# Patient Record
Sex: FEMALE | Race: WHITE | Employment: OTHER | URBAN - METROPOLITAN AREA
[De-identification: names, ages, dates, MRNs, and addresses within clinical notes are randomized per-mention and may not be internally consistent; named-entity substitution may affect disease eponyms.]

---

## 2021-01-01 ENCOUNTER — APPOINTMENT (OUTPATIENT)
Dept: CT IMAGING | Age: 61
DRG: 872 | End: 2021-01-01
Attending: EMERGENCY MEDICINE

## 2021-01-01 ENCOUNTER — HOSPITAL ENCOUNTER (INPATIENT)
Age: 61
LOS: 1 days | Discharge: HOSPICE | DRG: 872 | End: 2021-10-19
Attending: EMERGENCY MEDICINE | Admitting: HEALTH CARE PROVIDER

## 2021-01-01 ENCOUNTER — HOSPICE ADMISSION (OUTPATIENT)
Dept: HOSPICE | Facility: HOSPICE | Age: 61
End: 2021-01-01

## 2021-01-01 ENCOUNTER — APPOINTMENT (OUTPATIENT)
Dept: GENERAL RADIOLOGY | Age: 61
DRG: 872 | End: 2021-01-01
Attending: EMERGENCY MEDICINE

## 2021-01-01 ENCOUNTER — HOSPITAL ENCOUNTER (INPATIENT)
Age: 61
LOS: 5 days | End: 2021-10-25
Attending: FAMILY MEDICINE | Admitting: FAMILY MEDICINE

## 2021-01-01 ENCOUNTER — HOSPITAL ENCOUNTER (INPATIENT)
Age: 61
LOS: 1 days | Discharge: HOME | DRG: 951 | End: 2021-10-20
Attending: FAMILY MEDICINE | Admitting: FAMILY MEDICINE
Payer: OTHER MISCELLANEOUS

## 2021-01-01 VITALS
HEIGHT: 66 IN | DIASTOLIC BLOOD PRESSURE: 45 MMHG | OXYGEN SATURATION: 100 % | SYSTOLIC BLOOD PRESSURE: 79 MMHG | BODY MASS INDEX: 17.01 KG/M2 | RESPIRATION RATE: 22 BRPM | HEART RATE: 98 BPM | WEIGHT: 105.82 LBS | TEMPERATURE: 96.5 F

## 2021-01-01 VITALS
RESPIRATION RATE: 18 BRPM | SYSTOLIC BLOOD PRESSURE: 76 MMHG | OXYGEN SATURATION: 98 % | TEMPERATURE: 97.6 F | DIASTOLIC BLOOD PRESSURE: 55 MMHG | HEART RATE: 110 BPM

## 2021-01-01 VITALS
TEMPERATURE: 97.9 F | RESPIRATION RATE: 21 BRPM | HEART RATE: 30 BPM | DIASTOLIC BLOOD PRESSURE: 50 MMHG | OXYGEN SATURATION: 49 % | SYSTOLIC BLOOD PRESSURE: 88 MMHG

## 2021-01-01 DIAGNOSIS — Z51.5 HOSPICE CARE: ICD-10-CM

## 2021-01-01 DIAGNOSIS — S21.001S BREAST WOUND, RIGHT, SEQUELA: ICD-10-CM

## 2021-01-01 DIAGNOSIS — R22.2 CHEST WALL MASS: Primary | ICD-10-CM

## 2021-01-01 DIAGNOSIS — N63.0 BREAST MASS: ICD-10-CM

## 2021-01-01 DIAGNOSIS — G89.3 CANCER ASSOCIATED PAIN: ICD-10-CM

## 2021-01-01 DIAGNOSIS — C50.919 METASTATIC BREAST CANCER (HCC): ICD-10-CM

## 2021-01-01 DIAGNOSIS — E87.1 HYPONATREMIA: ICD-10-CM

## 2021-01-01 DIAGNOSIS — I95.9 HYPOTENSION, UNSPECIFIED HYPOTENSION TYPE: ICD-10-CM

## 2021-01-01 LAB
ABO + RH BLD: NORMAL
ALBUMIN SERPL-MCNC: 1 G/DL (ref 3.5–5)
ALBUMIN SERPL-MCNC: 1.4 G/DL (ref 3.5–5)
ALBUMIN/GLOB SERPL: 0.3 {RATIO} (ref 1.1–2.2)
ALBUMIN/GLOB SERPL: 0.6 {RATIO} (ref 1.1–2.2)
ALP SERPL-CCNC: 213 U/L (ref 45–117)
ALP SERPL-CCNC: 314 U/L (ref 45–117)
ALT SERPL-CCNC: 25 U/L (ref 12–78)
ALT SERPL-CCNC: 34 U/L (ref 12–78)
ANION GAP SERPL CALC-SCNC: 13 MMOL/L (ref 5–15)
ANION GAP SERPL CALC-SCNC: 7 MMOL/L (ref 5–15)
APPEARANCE UR: CLEAR
AST SERPL-CCNC: 27 U/L (ref 15–37)
AST SERPL-CCNC: 36 U/L (ref 15–37)
ATRIAL RATE: 96 BPM
BACTERIA SPEC CULT: ABNORMAL
BACTERIA URNS QL MICRO: NEGATIVE /HPF
BASOPHILS # BLD: 0 K/UL (ref 0–0.1)
BASOPHILS # BLD: 0 K/UL (ref 0–0.1)
BASOPHILS NFR BLD: 0 % (ref 0–1)
BASOPHILS NFR BLD: 0 % (ref 0–1)
BILIRUB SERPL-MCNC: 0.3 MG/DL (ref 0.2–1)
BILIRUB SERPL-MCNC: 0.5 MG/DL (ref 0.2–1)
BILIRUB UR QL: NEGATIVE
BLOOD GROUP ANTIBODIES SERPL: NORMAL
BUN SERPL-MCNC: 46 MG/DL (ref 6–20)
BUN SERPL-MCNC: 64 MG/DL (ref 6–20)
BUN/CREAT SERPL: 60 (ref 12–20)
BUN/CREAT SERPL: 61 (ref 12–20)
CALCIUM SERPL-MCNC: 7.8 MG/DL (ref 8.5–10.1)
CALCIUM SERPL-MCNC: 8.6 MG/DL (ref 8.5–10.1)
CALCULATED P AXIS, ECG09: 66 DEGREES
CALCULATED R AXIS, ECG10: -122 DEGREES
CALCULATED T AXIS, ECG11: 79 DEGREES
CC UR VC: ABNORMAL
CHLORIDE SERPL-SCNC: 103 MMOL/L (ref 97–108)
CHLORIDE SERPL-SCNC: 112 MMOL/L (ref 97–108)
CO2 SERPL-SCNC: 10 MMOL/L (ref 21–32)
CO2 SERPL-SCNC: 18 MMOL/L (ref 21–32)
COLOR UR: ABNORMAL
COMMENT, HOLDF: NORMAL
COVID-19 RAPID TEST, COVR: NOT DETECTED
CREAT SERPL-MCNC: 0.75 MG/DL (ref 0.55–1.02)
CREAT SERPL-MCNC: 1.06 MG/DL (ref 0.55–1.02)
DIAGNOSIS, 93000: NORMAL
DIFFERENTIAL METHOD BLD: ABNORMAL
DIFFERENTIAL METHOD BLD: ABNORMAL
EOSINOPHIL # BLD: 0 K/UL (ref 0–0.4)
EOSINOPHIL # BLD: 0 K/UL (ref 0–0.4)
EOSINOPHIL NFR BLD: 0 % (ref 0–7)
EOSINOPHIL NFR BLD: 0 % (ref 0–7)
EPITH CASTS URNS QL MICRO: ABNORMAL /LPF
ERYTHROCYTE [DISTWIDTH] IN BLOOD BY AUTOMATED COUNT: 16.6 % (ref 11.5–14.5)
ERYTHROCYTE [DISTWIDTH] IN BLOOD BY AUTOMATED COUNT: 16.6 % (ref 11.5–14.5)
GLOBULIN SER CALC-MCNC: 2.2 G/DL (ref 2–4)
GLOBULIN SER CALC-MCNC: 3.3 G/DL (ref 2–4)
GLUCOSE BLD STRIP.AUTO-MCNC: 96 MG/DL (ref 65–117)
GLUCOSE SERPL-MCNC: 123 MG/DL (ref 65–100)
GLUCOSE SERPL-MCNC: 53 MG/DL (ref 65–100)
GLUCOSE UR STRIP.AUTO-MCNC: NEGATIVE MG/DL
HCT VFR BLD AUTO: 26 % (ref 35–47)
HCT VFR BLD AUTO: 34.5 % (ref 35–47)
HGB BLD-MCNC: 11.5 G/DL (ref 11.5–16)
HGB BLD-MCNC: 8.6 G/DL (ref 11.5–16)
HGB UR QL STRIP: NEGATIVE
HYALINE CASTS URNS QL MICRO: ABNORMAL /LPF (ref 0–5)
IMM GRANULOCYTES # BLD AUTO: 0 K/UL
IMM GRANULOCYTES # BLD AUTO: 0 K/UL
IMM GRANULOCYTES NFR BLD AUTO: 0 %
IMM GRANULOCYTES NFR BLD AUTO: 0 %
KETONES UR QL STRIP.AUTO: ABNORMAL MG/DL
LACTATE SERPL-SCNC: 1.9 MMOL/L (ref 0.4–2)
LEUKOCYTE ESTERASE UR QL STRIP.AUTO: ABNORMAL
LYMPHOCYTES # BLD: 0.9 K/UL (ref 0.8–3.5)
LYMPHOCYTES # BLD: 1.5 K/UL (ref 0.8–3.5)
LYMPHOCYTES NFR BLD: 2 % (ref 12–49)
LYMPHOCYTES NFR BLD: 2 % (ref 12–49)
MAGNESIUM SERPL-MCNC: 1.9 MG/DL (ref 1.6–2.4)
MCH RBC QN AUTO: 25.5 PG (ref 26–34)
MCH RBC QN AUTO: 25.8 PG (ref 26–34)
MCHC RBC AUTO-ENTMCNC: 33.1 G/DL (ref 30–36.5)
MCHC RBC AUTO-ENTMCNC: 33.3 G/DL (ref 30–36.5)
MCV RBC AUTO: 76.5 FL (ref 80–99)
MCV RBC AUTO: 78.1 FL (ref 80–99)
METAMYELOCYTES NFR BLD MANUAL: 1 %
METAMYELOCYTES NFR BLD MANUAL: 1 %
MONOCYTES # BLD: 0.7 K/UL (ref 0–1)
MONOCYTES # BLD: 1.7 K/UL (ref 0–1)
MONOCYTES NFR BLD: 1 % (ref 5–13)
MONOCYTES NFR BLD: 4 % (ref 5–13)
MUCOUS THREADS URNS QL MICRO: ABNORMAL /LPF
MYELOCYTES NFR BLD MANUAL: 1 %
NEUTS BAND NFR BLD MANUAL: 2 % (ref 0–6)
NEUTS BAND NFR BLD MANUAL: 4 % (ref 0–6)
NEUTS SEG # BLD: 39.8 K/UL (ref 1.8–8)
NEUTS SEG # BLD: 69.9 K/UL (ref 1.8–8)
NEUTS SEG NFR BLD: 89 % (ref 32–75)
NEUTS SEG NFR BLD: 93 % (ref 32–75)
NITRITE UR QL STRIP.AUTO: NEGATIVE
NRBC # BLD: 0 K/UL (ref 0–0.01)
NRBC # BLD: 0 K/UL (ref 0–0.01)
NRBC BLD-RTO: 0 PER 100 WBC
NRBC BLD-RTO: 0 PER 100 WBC
OTHER,OTHU: ABNORMAL
P-R INTERVAL, ECG05: 126 MS
PH UR STRIP: 5.5 [PH] (ref 5–8)
PHOSPHATE SERPL-MCNC: 3.8 MG/DL (ref 2.6–4.7)
PLATELET # BLD AUTO: 484 K/UL (ref 150–400)
PLATELET # BLD AUTO: 627 K/UL (ref 150–400)
PMV BLD AUTO: 9.4 FL (ref 8.9–12.9)
PMV BLD AUTO: 9.4 FL (ref 8.9–12.9)
POTASSIUM SERPL-SCNC: 4.1 MMOL/L (ref 3.5–5.1)
POTASSIUM SERPL-SCNC: 5.2 MMOL/L (ref 3.5–5.1)
PROCALCITONIN SERPL-MCNC: 0.98 NG/ML
PROT SERPL-MCNC: 3.6 G/DL (ref 6.4–8.2)
PROT SERPL-MCNC: 4.3 G/DL (ref 6.4–8.2)
PROT UR STRIP-MCNC: ABNORMAL MG/DL
Q-T INTERVAL, ECG07: 330 MS
QRS DURATION, ECG06: 74 MS
QTC CALCULATION (BEZET), ECG08: 416 MS
RBC # BLD AUTO: 3.33 M/UL (ref 3.8–5.2)
RBC # BLD AUTO: 4.51 M/UL (ref 3.8–5.2)
RBC #/AREA URNS HPF: ABNORMAL /HPF (ref 0–5)
RBC MORPH BLD: ABNORMAL
SAMPLES BEING HELD,HOLD: NORMAL
SERVICE CMNT-IMP: ABNORMAL
SERVICE CMNT-IMP: ABNORMAL
SERVICE CMNT-IMP: NORMAL
SODIUM SERPL-SCNC: 128 MMOL/L (ref 136–145)
SODIUM SERPL-SCNC: 135 MMOL/L (ref 136–145)
SOURCE, COVRS: NORMAL
SP GR UR REFRACTOMETRY: 1.02 (ref 1–1.03)
SPECIMEN EXP DATE BLD: NORMAL
UROBILINOGEN UR QL STRIP.AUTO: 0.2 EU/DL (ref 0.2–1)
VENTRICULAR RATE, ECG03: 96 BPM
WBC # BLD AUTO: 42.8 K/UL (ref 3.6–11)
WBC # BLD AUTO: 73.6 K/UL (ref 3.6–11)
WBC MORPH BLD: ABNORMAL
WBC URNS QL MICRO: ABNORMAL /HPF (ref 0–4)

## 2021-01-01 PROCEDURE — 71045 X-RAY EXAM CHEST 1 VIEW: CPT

## 2021-01-01 PROCEDURE — G0299 HHS/HOSPICE OF RN EA 15 MIN: HCPCS

## 2021-01-01 PROCEDURE — 83735 ASSAY OF MAGNESIUM: CPT

## 2021-01-01 PROCEDURE — 84100 ASSAY OF PHOSPHORUS: CPT

## 2021-01-01 PROCEDURE — 3336590001 HSPC ROOM AND BOARD

## 2021-01-01 PROCEDURE — 74011250636 HC RX REV CODE- 250/636: Performed by: NURSE PRACTITIONER

## 2021-01-01 PROCEDURE — 83605 ASSAY OF LACTIC ACID: CPT

## 2021-01-01 PROCEDURE — 74177 CT ABD & PELVIS W/CONTRAST: CPT

## 2021-01-01 PROCEDURE — 0651 HSPC ROUTINE HOME CARE

## 2021-01-01 PROCEDURE — 74011250636 HC RX REV CODE- 250/636: Performed by: FAMILY MEDICINE

## 2021-01-01 PROCEDURE — 87635 SARS-COV-2 COVID-19 AMP PRB: CPT

## 2021-01-01 PROCEDURE — 74011250637 HC RX REV CODE- 250/637: Performed by: NURSE PRACTITIONER

## 2021-01-01 PROCEDURE — 51798 US URINE CAPACITY MEASURE: CPT

## 2021-01-01 PROCEDURE — 3336500001 HSPC ELECTION

## 2021-01-01 PROCEDURE — 99221 1ST HOSP IP/OBS SF/LOW 40: CPT | Performed by: NURSE PRACTITIONER

## 2021-01-01 PROCEDURE — 80053 COMPREHEN METABOLIC PANEL: CPT

## 2021-01-01 PROCEDURE — 87186 SC STD MICRODIL/AGAR DIL: CPT

## 2021-01-01 PROCEDURE — 71260 CT THORAX DX C+: CPT

## 2021-01-01 PROCEDURE — 87086 URINE CULTURE/COLONY COUNT: CPT

## 2021-01-01 PROCEDURE — 86901 BLOOD TYPING SEROLOGIC RH(D): CPT

## 2021-01-01 PROCEDURE — 36415 COLL VENOUS BLD VENIPUNCTURE: CPT

## 2021-01-01 PROCEDURE — 0656 HSPC GENERAL INPATIENT

## 2021-01-01 PROCEDURE — 74011000636 HC RX REV CODE- 636: Performed by: EMERGENCY MEDICINE

## 2021-01-01 PROCEDURE — 82962 GLUCOSE BLOOD TEST: CPT

## 2021-01-01 PROCEDURE — 36600 WITHDRAWAL OF ARTERIAL BLOOD: CPT

## 2021-01-01 PROCEDURE — 74011250636 HC RX REV CODE- 250/636: Performed by: EMERGENCY MEDICINE

## 2021-01-01 PROCEDURE — 74011250637 HC RX REV CODE- 250/637: Performed by: FAMILY MEDICINE

## 2021-01-01 PROCEDURE — 74011000258 HC RX REV CODE- 258: Performed by: NURSE PRACTITIONER

## 2021-01-01 PROCEDURE — 99252 IP/OBS CONSLTJ NEW/EST SF 35: CPT | Performed by: SURGERY

## 2021-01-01 PROCEDURE — 84145 PROCALCITONIN (PCT): CPT

## 2021-01-01 PROCEDURE — 85025 COMPLETE CBC W/AUTO DIFF WBC: CPT

## 2021-01-01 PROCEDURE — 99223 1ST HOSP IP/OBS HIGH 75: CPT | Performed by: INTERNAL MEDICINE

## 2021-01-01 PROCEDURE — 99285 EMERGENCY DEPT VISIT HI MDM: CPT

## 2021-01-01 PROCEDURE — 99233 SBSQ HOSP IP/OBS HIGH 50: CPT | Performed by: INTERNAL MEDICINE

## 2021-01-01 PROCEDURE — P9045 ALBUMIN (HUMAN), 5%, 250 ML: HCPCS | Performed by: NURSE PRACTITIONER

## 2021-01-01 PROCEDURE — 65270000032 HC RM SEMIPRIVATE

## 2021-01-01 PROCEDURE — 96365 THER/PROPH/DIAG IV INF INIT: CPT

## 2021-01-01 PROCEDURE — 96375 TX/PRO/DX INJ NEW DRUG ADDON: CPT

## 2021-01-01 PROCEDURE — 87077 CULTURE AEROBIC IDENTIFY: CPT

## 2021-01-01 PROCEDURE — 81001 URINALYSIS AUTO W/SCOPE: CPT

## 2021-01-01 PROCEDURE — 87040 BLOOD CULTURE FOR BACTERIA: CPT

## 2021-01-01 PROCEDURE — 65270000029 HC RM PRIVATE

## 2021-01-01 PROCEDURE — 65610000006 HC RM INTENSIVE CARE

## 2021-01-01 PROCEDURE — 93005 ELECTROCARDIOGRAM TRACING: CPT

## 2021-01-01 PROCEDURE — 74011000250 HC RX REV CODE- 250: Performed by: EMERGENCY MEDICINE

## 2021-01-01 RX ORDER — HYDROMORPHONE HYDROCHLORIDE 1 MG/ML
1 INJECTION, SOLUTION INTRAMUSCULAR; INTRAVENOUS; SUBCUTANEOUS EVERY 4 HOURS
Status: DISCONTINUED | OUTPATIENT
Start: 2021-01-01 | End: 2021-01-01 | Stop reason: HOSPADM

## 2021-01-01 RX ORDER — LORAZEPAM 2 MG/ML
1 INJECTION INTRAMUSCULAR
Status: DISCONTINUED | OUTPATIENT
Start: 2021-01-01 | End: 2021-01-01 | Stop reason: HOSPADM

## 2021-01-01 RX ORDER — METRONIDAZOLE 7.5 MG/G
GEL TOPICAL 2 TIMES DAILY
Status: DISCONTINUED | OUTPATIENT
Start: 2021-01-01 | End: 2021-01-01 | Stop reason: HOSPADM

## 2021-01-01 RX ORDER — METRONIDAZOLE 250 MG/1
1000 TABLET ORAL DAILY
Status: DISCONTINUED | OUTPATIENT
Start: 2021-01-01 | End: 2021-01-01 | Stop reason: HOSPADM

## 2021-01-01 RX ORDER — LORAZEPAM 2 MG/ML
1 INJECTION INTRAMUSCULAR
Status: DISCONTINUED | OUTPATIENT
Start: 2021-01-01 | End: 2021-01-01 | Stop reason: DRUGHIGH

## 2021-01-01 RX ORDER — FACIAL-BODY WIPES
10 EACH TOPICAL DAILY PRN
Status: DISCONTINUED | OUTPATIENT
Start: 2021-01-01 | End: 2021-01-01 | Stop reason: HOSPADM

## 2021-01-01 RX ORDER — ACETAMINOPHEN 650 MG/1
650 SUPPOSITORY RECTAL
Status: DISCONTINUED | OUTPATIENT
Start: 2021-01-01 | End: 2021-01-01 | Stop reason: HOSPADM

## 2021-01-01 RX ORDER — MORPHINE SULFATE 2 MG/ML
2 INJECTION, SOLUTION INTRAMUSCULAR; INTRAVENOUS EVERY 4 HOURS
Status: DISCONTINUED | OUTPATIENT
Start: 2021-01-01 | End: 2021-01-01 | Stop reason: HOSPADM

## 2021-01-01 RX ORDER — MORPHINE SULFATE 2 MG/ML
2 INJECTION, SOLUTION INTRAMUSCULAR; INTRAVENOUS
Status: DISCONTINUED | OUTPATIENT
Start: 2021-01-01 | End: 2021-01-01 | Stop reason: HOSPADM

## 2021-01-01 RX ORDER — LORAZEPAM 2 MG/ML
0.5 CONCENTRATE ORAL
Status: DISCONTINUED | OUTPATIENT
Start: 2021-01-01 | End: 2021-01-01 | Stop reason: DRUGHIGH

## 2021-01-01 RX ORDER — METRONIDAZOLE 250 MG/1
500 TABLET ORAL DAILY
Status: DISCONTINUED | OUTPATIENT
Start: 2021-01-01 | End: 2021-01-01

## 2021-01-01 RX ORDER — MIDODRINE HYDROCHLORIDE 5 MG/1
10 TABLET ORAL EVERY 8 HOURS
Status: DISCONTINUED | OUTPATIENT
Start: 2021-01-01 | End: 2021-01-01

## 2021-01-01 RX ORDER — MORPHINE SULFATE 20 MG/ML
5 SOLUTION ORAL
Status: DISCONTINUED | OUTPATIENT
Start: 2021-01-01 | End: 2021-01-01 | Stop reason: DRUGHIGH

## 2021-01-01 RX ORDER — MORPHINE SULFATE 2 MG/ML
2 INJECTION, SOLUTION INTRAMUSCULAR; INTRAVENOUS
Status: DISCONTINUED | OUTPATIENT
Start: 2021-01-01 | End: 2021-01-01

## 2021-01-01 RX ORDER — LORAZEPAM 2 MG/ML
1 INJECTION INTRAMUSCULAR EVERY 4 HOURS
Status: DISCONTINUED | OUTPATIENT
Start: 2021-01-01 | End: 2021-01-01 | Stop reason: HOSPADM

## 2021-01-01 RX ORDER — SODIUM CHLORIDE 0.9 % (FLUSH) 0.9 %
5-40 SYRINGE (ML) INJECTION AS NEEDED
Status: DISCONTINUED | OUTPATIENT
Start: 2021-01-01 | End: 2021-01-01 | Stop reason: HOSPADM

## 2021-01-01 RX ORDER — HYDROMORPHONE HYDROCHLORIDE 1 MG/ML
1 INJECTION, SOLUTION INTRAMUSCULAR; INTRAVENOUS; SUBCUTANEOUS
Status: DISCONTINUED | OUTPATIENT
Start: 2021-01-01 | End: 2021-01-01 | Stop reason: HOSPADM

## 2021-01-01 RX ORDER — SODIUM CHLORIDE 0.9 % (FLUSH) 0.9 %
5-40 SYRINGE (ML) INJECTION EVERY 8 HOURS
Status: DISCONTINUED | OUTPATIENT
Start: 2021-01-01 | End: 2021-01-01 | Stop reason: HOSPADM

## 2021-01-01 RX ORDER — MORPHINE SULFATE 2 MG/ML
2 INJECTION, SOLUTION INTRAMUSCULAR; INTRAVENOUS EVERY 4 HOURS
Status: DISCONTINUED | OUTPATIENT
Start: 2021-01-01 | End: 2021-01-01

## 2021-01-01 RX ORDER — GLYCOPYRROLATE 0.2 MG/ML
0.2 INJECTION INTRAMUSCULAR; INTRAVENOUS
Status: DISCONTINUED | OUTPATIENT
Start: 2021-01-01 | End: 2021-01-01 | Stop reason: HOSPADM

## 2021-01-01 RX ORDER — PEPPERMINT OIL
SPIRIT ORAL AS NEEDED
Status: DISCONTINUED | OUTPATIENT
Start: 2021-01-01 | End: 2021-01-01 | Stop reason: HOSPADM

## 2021-01-01 RX ORDER — SODIUM CHLORIDE 9 MG/ML
125 INJECTION, SOLUTION INTRAVENOUS CONTINUOUS
Status: DISCONTINUED | OUTPATIENT
Start: 2021-01-01 | End: 2021-01-01

## 2021-01-01 RX ORDER — AMOXICILLIN 250 MG
2 CAPSULE ORAL DAILY
Status: DISCONTINUED | OUTPATIENT
Start: 2021-01-01 | End: 2021-01-01

## 2021-01-01 RX ORDER — ALBUMIN HUMAN 50 G/1000ML
25 SOLUTION INTRAVENOUS ONCE
Status: COMPLETED | OUTPATIENT
Start: 2021-01-01 | End: 2021-01-01

## 2021-01-01 RX ORDER — MORPHINE SULFATE 4 MG/ML
4 INJECTION INTRAVENOUS EVERY 4 HOURS
Status: DISCONTINUED | OUTPATIENT
Start: 2021-01-01 | End: 2021-01-01 | Stop reason: DRUGHIGH

## 2021-01-01 RX ORDER — MORPHINE SULFATE 4 MG/ML
4 INJECTION INTRAVENOUS
Status: DISCONTINUED | OUTPATIENT
Start: 2021-01-01 | End: 2021-01-01 | Stop reason: DRUGHIGH

## 2021-01-01 RX ORDER — ACETAMINOPHEN 325 MG/1
650 TABLET ORAL
Status: DISCONTINUED | OUTPATIENT
Start: 2021-01-01 | End: 2021-01-01 | Stop reason: HOSPADM

## 2021-01-01 RX ADMIN — MORPHINE SULFATE 4 MG: 4 INJECTION INTRAVENOUS at 12:01

## 2021-01-01 RX ADMIN — METRONIDAZOLE: 7.5 GEL TOPICAL at 09:22

## 2021-01-01 RX ADMIN — MORPHINE SULFATE 5 MG: 10 SOLUTION ORAL at 05:31

## 2021-01-01 RX ADMIN — LORAZEPAM 0.5 MG: 2 SOLUTION, CONCENTRATE ORAL at 07:46

## 2021-01-01 RX ADMIN — MORPHINE SULFATE 5 MG: 10 SOLUTION ORAL at 13:49

## 2021-01-01 RX ADMIN — MIDODRINE HYDROCHLORIDE 10 MG: 5 TABLET ORAL at 06:26

## 2021-01-01 RX ADMIN — LORAZEPAM 1 MG: 2 INJECTION INTRAMUSCULAR; INTRAVENOUS at 16:38

## 2021-01-01 RX ADMIN — MORPHINE SULFATE 5 MG: 10 SOLUTION ORAL at 07:56

## 2021-01-01 RX ADMIN — LORAZEPAM 1 MG: 2 INJECTION INTRAMUSCULAR; INTRAVENOUS at 12:01

## 2021-01-01 RX ADMIN — MORPHINE SULFATE 2 MG: 2 INJECTION, SOLUTION INTRAMUSCULAR; INTRAVENOUS at 21:46

## 2021-01-01 RX ADMIN — MORPHINE SULFATE 5 MG: 10 SOLUTION ORAL at 03:32

## 2021-01-01 RX ADMIN — Medication 10 ML: at 14:00

## 2021-01-01 RX ADMIN — METRONIDAZOLE 1000 MG: 250 TABLET ORAL at 11:39

## 2021-01-01 RX ADMIN — MORPHINE SULFATE 2 MG: 2 INJECTION, SOLUTION INTRAMUSCULAR; INTRAVENOUS at 16:35

## 2021-01-01 RX ADMIN — VANCOMYCIN HYDROCHLORIDE 1000 MG: 1 INJECTION, POWDER, LYOPHILIZED, FOR SOLUTION INTRAVENOUS at 17:14

## 2021-01-01 RX ADMIN — MORPHINE SULFATE 5 MG: 10 SOLUTION ORAL at 20:39

## 2021-01-01 RX ADMIN — METRONIDAZOLE 1000 MG: 250 TABLET ORAL at 14:01

## 2021-01-01 RX ADMIN — LORAZEPAM 1 MG: 2 INJECTION INTRAMUSCULAR; INTRAVENOUS at 20:30

## 2021-01-01 RX ADMIN — MIDODRINE HYDROCHLORIDE 10 MG: 5 TABLET ORAL at 22:50

## 2021-01-01 RX ADMIN — LORAZEPAM 1 MG: 2 INJECTION INTRAMUSCULAR; INTRAVENOUS at 13:30

## 2021-01-01 RX ADMIN — MORPHINE SULFATE 2 MG: 2 INJECTION, SOLUTION INTRAMUSCULAR; INTRAVENOUS at 21:45

## 2021-01-01 RX ADMIN — MORPHINE SULFATE 5 MG: 10 SOLUTION ORAL at 18:29

## 2021-01-01 RX ADMIN — HYDROMORPHONE HYDROCHLORIDE 1 MG: 1 INJECTION, SOLUTION INTRAMUSCULAR; INTRAVENOUS; SUBCUTANEOUS at 16:37

## 2021-01-01 RX ADMIN — MORPHINE SULFATE 5 MG: 10 SOLUTION ORAL at 06:32

## 2021-01-01 RX ADMIN — Medication 10 ML: at 06:00

## 2021-01-01 RX ADMIN — LORAZEPAM 0.5 MG: 2 SOLUTION, CONCENTRATE ORAL at 05:11

## 2021-01-01 RX ADMIN — SODIUM CHLORIDE 1000 ML: 9 INJECTION, SOLUTION INTRAVENOUS at 14:31

## 2021-01-01 RX ADMIN — BISACODYL 10 MG: 10 SUPPOSITORY RECTAL at 16:54

## 2021-01-01 RX ADMIN — MORPHINE SULFATE 5 MG: 10 SOLUTION ORAL at 19:52

## 2021-01-01 RX ADMIN — MORPHINE SULFATE 2 MG: 2 INJECTION, SOLUTION INTRAMUSCULAR; INTRAVENOUS at 03:57

## 2021-01-01 RX ADMIN — SODIUM CHLORIDE 125 ML/HR: 9 INJECTION, SOLUTION INTRAVENOUS at 20:45

## 2021-01-01 RX ADMIN — SODIUM CHLORIDE 383 ML: 9 INJECTION, SOLUTION INTRAVENOUS at 15:06

## 2021-01-01 RX ADMIN — MORPHINE SULFATE 5 MG: 10 SOLUTION ORAL at 07:47

## 2021-01-01 RX ADMIN — MORPHINE SULFATE 5 MG: 10 SOLUTION ORAL at 05:11

## 2021-01-01 RX ADMIN — MORPHINE SULFATE 4 MG: 4 INJECTION INTRAVENOUS at 09:34

## 2021-01-01 RX ADMIN — Medication 10 ML: at 06:27

## 2021-01-01 RX ADMIN — METRONIDAZOLE: 7.5 GEL TOPICAL at 17:56

## 2021-01-01 RX ADMIN — SODIUM CHLORIDE 1000 ML: 9 INJECTION, SOLUTION INTRAVENOUS at 18:39

## 2021-01-01 RX ADMIN — MORPHINE SULFATE 4 MG: 4 INJECTION INTRAVENOUS at 09:01

## 2021-01-01 RX ADMIN — LORAZEPAM 1 MG: 2 INJECTION INTRAMUSCULAR; INTRAVENOUS at 09:33

## 2021-01-01 RX ADMIN — CEFEPIME HYDROCHLORIDE 1 G: 1 INJECTION, POWDER, FOR SOLUTION INTRAMUSCULAR; INTRAVENOUS at 17:04

## 2021-01-01 RX ADMIN — METRONIDAZOLE 500 MG: 250 TABLET ORAL at 09:49

## 2021-01-01 RX ADMIN — MORPHINE SULFATE 2 MG: 2 INJECTION, SOLUTION INTRAMUSCULAR; INTRAVENOUS at 03:27

## 2021-01-01 RX ADMIN — MORPHINE SULFATE 4 MG: 4 INJECTION INTRAVENOUS at 13:30

## 2021-01-01 RX ADMIN — ALBUMIN (HUMAN) 25 G: 12.5 INJECTION, SOLUTION INTRAVENOUS at 03:14

## 2021-01-01 RX ADMIN — HYDROMORPHONE HYDROCHLORIDE 1 MG: 1 INJECTION, SOLUTION INTRAMUSCULAR; INTRAVENOUS; SUBCUTANEOUS at 20:30

## 2021-01-01 RX ADMIN — Medication 5 ML: at 17:09

## 2021-01-01 RX ADMIN — IOPAMIDOL 100 ML: 755 INJECTION, SOLUTION INTRAVENOUS at 16:31

## 2021-01-01 RX ADMIN — PIPERACILLIN AND TAZOBACTAM 3.38 G: 3; .375 INJECTION, POWDER, LYOPHILIZED, FOR SOLUTION INTRAVENOUS at 01:28

## 2021-01-01 RX ADMIN — LORAZEPAM 1 MG: 2 INJECTION INTRAMUSCULAR; INTRAVENOUS at 09:01

## 2021-01-01 RX ADMIN — MORPHINE SULFATE 1 MG: 2 INJECTION, SOLUTION INTRAMUSCULAR; INTRAVENOUS at 13:53

## 2021-01-01 RX ADMIN — MORPHINE SULFATE 5 MG: 10 SOLUTION ORAL at 20:27

## 2021-01-01 RX ADMIN — HYDROMORPHONE HYDROCHLORIDE 1 MG: 1 INJECTION, SOLUTION INTRAMUSCULAR; INTRAVENOUS; SUBCUTANEOUS at 00:29

## 2021-01-01 RX ADMIN — DOCUSATE SODIUM 50MG AND SENNOSIDES 8.6MG 2 TABLET: 8.6; 5 TABLET, FILM COATED ORAL at 16:59

## 2021-01-01 RX ADMIN — METRONIDAZOLE 1000 MG: 250 TABLET ORAL at 09:00

## 2021-01-01 RX ADMIN — LORAZEPAM 1 MG: 2 INJECTION INTRAMUSCULAR; INTRAVENOUS at 00:29

## 2021-01-01 RX ADMIN — MORPHINE SULFATE 5 MG: 10 SOLUTION ORAL at 00:23

## 2021-01-01 RX ADMIN — PIPERACILLIN AND TAZOBACTAM 3.38 G: 3; .375 INJECTION, POWDER, LYOPHILIZED, FOR SOLUTION INTRAVENOUS at 18:23

## 2021-10-18 PROBLEM — N63.0 BREAST MASS: Status: ACTIVE | Noted: 2021-01-01

## 2021-10-18 NOTE — WOUND CARE
Discussed by phone with RN regarding erupting breast tumor. General surgery has seen her today & will consult breast surgeon. Advised ER RN to use non-adherent contact layer like Mepitel One since these areas are very friable and possibly Metrogel for odor. Will see tomorrow.    Brenda Pierre, GABBYN

## 2021-10-18 NOTE — CONSULTS
Consult Date: 10/18/2021    Consults    Subjective  56 yo female presents with fungating, ulcerating right breast mass. She has been self treating this for 3 years. She states she believed it to be caused by parasites and she was de-worming herself. History reviewed. No pertinent past medical history. Past Surgical History:   Procedure Laterality Date    HX CATARACT REMOVAL       History reviewed. No pertinent family history. Social History     Tobacco Use    Smoking status: Never Smoker    Smokeless tobacco: Never Used   Substance Use Topics    Alcohol use: Not on file       Current Facility-Administered Medications   Medication Dose Route Frequency Provider Last Rate Last Admin    cefepime (MAXIPIME) 1 g in sterile water (preservative free) 10 mL IV syringe  1 g IntraVENous NOW Lesly Kohler MD        vancomycin (VANCOCIN) 1,000 mg in 0.9% sodium chloride 250 mL (VIAL-MATE)  1,000 mg IntraVENous NOW Lesly Kohler MD        iopamidoL (ISOVUE-370) 76 % injection 100 mL  100 mL IntraVENous RAD ONCE Lesly Kohler MD            Review of Systems   All other systems reviewed and are negative. Objective     Vital signs for last 24 hours:  Visit Vitals  BP (!) 79/60   Pulse 97   Temp 96.9 °F (36.1 °C)   Resp 22   Ht 5' 6\" (1.676 m)   Wt 101 lb 10.1 oz (46.1 kg)   SpO2 98%   BMI 16.40 kg/m²       Intake/Output this shift:  Current Shift: No intake/output data recorded. Last 3 Shifts: No intake/output data recorded.     Data Review:   Recent Results (from the past 24 hour(s))   CBC WITH AUTOMATED DIFF    Collection Time: 10/18/21  2:00 PM   Result Value Ref Range    WBC 73.6 (HH) 3.6 - 11.0 K/uL    RBC 4.51 3.80 - 5.20 M/uL    HGB 11.5 11.5 - 16.0 g/dL    HCT 34.5 (L) 35.0 - 47.0 %    MCV 76.5 (L) 80.0 - 99.0 FL    MCH 25.5 (L) 26.0 - 34.0 PG    MCHC 33.3 30.0 - 36.5 g/dL    RDW 16.6 (H) 11.5 - 14.5 %    PLATELET 553 (H) 142 - 400 K/uL    MPV 9.4 8.9 - 12.9 FL NRBC 0.0 0  WBC    ABSOLUTE NRBC 0.00 0.00 - 0.01 K/uL    NEUTROPHILS 93 (H) 32 - 75 %    BAND NEUTROPHILS 2 0 - 6 %    LYMPHOCYTES 2 (L) 12 - 49 %    MONOCYTES 1 (L) 5 - 13 %    EOSINOPHILS 0 0 - 7 %    BASOPHILS 0 0 - 1 %    METAMYELOCYTES 1 (H) 0 %    MYELOCYTES 1 (H) 0 %    IMMATURE GRANULOCYTES 0 %    ABS. NEUTROPHILS 69.9 (H) 1.8 - 8.0 K/UL    ABS. LYMPHOCYTES 1.5 0.8 - 3.5 K/UL    ABS. MONOCYTES 0.7 0.0 - 1.0 K/UL    ABS. EOSINOPHILS 0.0 0.0 - 0.4 K/UL    ABS. BASOPHILS 0.0 0.0 - 0.1 K/UL    ABS. IMM. GRANS. 0.0 K/UL    DF MANUAL      RBC COMMENTS ANISOCYTOSIS  1+        RBC COMMENTS WILEY CELLS  PRESENT        RBC COMMENTS MICROCYTOSIS  1+        RBC COMMENTS OVALOCYTES  PRESENT        WBC COMMENTS Pathology Review Requested     METABOLIC PANEL, COMPREHENSIVE    Collection Time: 10/18/21  2:00 PM   Result Value Ref Range    Sodium 128 (L) 136 - 145 mmol/L    Potassium 5.2 (H) 3.5 - 5.1 mmol/L    Chloride 103 97 - 108 mmol/L    CO2 18 (L) 21 - 32 mmol/L    Anion gap 7 5 - 15 mmol/L    Glucose 123 (H) 65 - 100 mg/dL    BUN 64 (H) 6 - 20 MG/DL    Creatinine 1.06 (H) 0.55 - 1.02 MG/DL    BUN/Creatinine ratio 60 (H) 12 - 20      GFR est AA >60 >60 ml/min/1.73m2    GFR est non-AA 53 (L) >60 ml/min/1.73m2    Calcium 8.6 8.5 - 10.1 MG/DL    Bilirubin, total 0.3 0.2 - 1.0 MG/DL    ALT (SGPT) 34 12 - 78 U/L    AST (SGOT) 36 15 - 37 U/L    Alk. phosphatase 314 (H) 45 - 117 U/L    Protein, total 4.3 (L) 6.4 - 8.2 g/dL    Albumin 1.0 (L) 3.5 - 5.0 g/dL    Globulin 3.3 2.0 - 4.0 g/dL    A-G Ratio 0.3 (L) 1.1 - 2.2     SAMPLES BEING HELD    Collection Time: 10/18/21  2:00 PM   Result Value Ref Range    SAMPLES BEING HELD 1RED 1BLU     COMMENT        Add-on orders for these samples will be processed based on acceptable specimen integrity and analyte stability, which may vary by analyte.    LACTIC ACID    Collection Time: 10/18/21  2:00 PM   Result Value Ref Range    Lactic acid 1.9 0.4 - 2.0 MMOL/L   PROCALCITONIN Collection Time: 10/18/21  2:00 PM   Result Value Ref Range    Procalcitonin 0.98 ng/mL   EKG, 12 LEAD, INITIAL    Collection Time: 10/18/21  2:23 PM   Result Value Ref Range    Ventricular Rate 96 BPM    Atrial Rate 96 BPM    P-R Interval 126 ms    QRS Duration 74 ms    Q-T Interval 330 ms    QTC Calculation (Bezet) 416 ms    Calculated P Axis 66 degrees    Calculated R Axis -122 degrees    Calculated T Axis 79 degrees    Diagnosis       ** Poor data quality, interpretation may be adversely affected Normal sinus   rhythm  Right atrial enlargement  Right superior axis deviation  Nonspecific ST abnormality  Abnormal ECG  No previous ECGs available  Confirmed by Denisa Ken M.D., Marce Desai ([id number]) on 10/18/2021 2:34:57 PM     URINALYSIS W/MICROSCOPIC    Collection Time: 10/18/21  2:40 PM   Result Value Ref Range    Color DARK YELLOW      Appearance CLEAR CLEAR      Specific gravity 1.022 1.003 - 1.030      pH (UA) 5.5 5.0 - 8.0      Protein TRACE (A) NEG mg/dL    Glucose Negative NEG mg/dL    Ketone TRACE (A) NEG mg/dL    Bilirubin Negative NEG      Blood Negative NEG      Urobilinogen 0.2 0.2 - 1.0 EU/dL    Nitrites Negative NEG      Leukocyte Esterase MODERATE (A) NEG      WBC 10-20 0 - 4 /hpf    RBC 0-5 0 - 5 /hpf    Epithelial cells FEW FEW /lpf    Bacteria Negative NEG /hpf    Mucus 1+ (A) NEG /lpf    Hyaline cast 2-5 0 - 5 /lpf    Other: Renal Epithelial cells Present         Physical Exam  Breast: Right breast completely gone, only 12 x 12 cm fungating mass, ulcerated. Malodorous. No nipple. The is also an ulcerated lesion mid sternum 4 x 4 cm     A/P  Locally advanced breast carcinoma  - Spoke with Dr. Chanelle Merlos in ED. Will check scans. I will do confirmatory punch biopsy tomorrow. Likely metastatic at this point. Recommend consulting wound care and medical oncology. Surgical intervention not warranted. Unresectable. Admit to medicine for hypotension, possible infection.

## 2021-10-18 NOTE — PROGRESS NOTES
[facility] [place] [id number] [address]  W: [phone]  F: [phone]    Reason for Visit:   Rakesh King is a 57 y.o. female who is seen in ER at the request of Dr. Peewee Craig / ER attending for evaluation of breast mass. Treatment History:   No tissue dx yet    STAGE: pending    History of Present Illness:     Pt seen today in ER for large fungating RIGHT breast mass present for years. Pt does not sees doctors and states she was taking de worming medicine for breast mass. No tissue dx of cancer. Pt is cachetic in bed. States she know mass may be cancer but wants to know origin of cancer. Pt denies pain. Nursing at bedside. CTs pending. Wbc 70 k. No fevers/ chills/ chest pain/ SOB/ nausea/ vomiting/diarrhea/ pain/      History reviewed. No pertinent past medical history. Past Surgical History:   Procedure Laterality Date    HX CATARACT REMOVAL        Social History     Tobacco Use    Smoking status: Never Smoker    Smokeless tobacco: Never Used   Substance Use Topics    Alcohol use: Not on file      History reviewed. No pertinent family history. Current Facility-Administered Medications   Medication Dose Route Frequency    sodium chloride (NS) flush 5-40 mL  5-40 mL IntraVENous Q8H    sodium chloride (NS) flush 5-40 mL  5-40 mL IntraVENous PRN    piperacillin-tazobactam (ZOSYN) 3.375 g in 0.9% sodium chloride (MBP/ADV) 100 mL MBP  3.375 g IntraVENous Q8H    sodium chloride 0.9 % bolus infusion 1,000 mL  1,000 mL IntraVENous ONCE    0.9% sodium chloride infusion  125 mL/hr IntraVENous CONTINUOUS     No current outpatient medications on file. No Known Allergies     Review of Systems: A complete review of systems was obtained, negative except as described above.     Physical Exam:     Visit Vitals  BP (!) 83/53   Pulse 99   Temp 96.9 °F (36.1 °C)   Resp 20   Ht 5' 6\" (1.676 m)   Wt 101 lb 10.1 oz (46.1 kg)   SpO2 98%   BMI 16.40 kg/m²     ECOG PS: 2  General: No distress  Eyes: anicteric sclerae  HENT: Atraumatic  Neck: Supple  Respiratory: CTAB, normal respiratory effort  CV: Normal rate, regular rhythm, no murmurs, no peripheral edema  GI: Soft, nontender, nondistended, no masses, no hepatomegaly, no splenomegaly  MS: in bed moving  Skin:  Skin changes RIGHT breast  Breast large fungating, malodorous, draining diffuse RIGHT breast mass  Psych:  Awake, conversant    Results:     Lab Results   Component Value Date/Time    WBC 73.6 (HH) 10/18/2021 02:00 PM    HGB 11.5 10/18/2021 02:00 PM    HCT 34.5 (L) 10/18/2021 02:00 PM    PLATELET 081 (H) 03/20/4187 02:00 PM    MCV 76.5 (L) 10/18/2021 02:00 PM    ABS. NEUTROPHILS 69.9 (H) 10/18/2021 02:00 PM     Lab Results   Component Value Date/Time    Sodium 128 (L) 10/18/2021 02:00 PM    Potassium 5.2 (H) 10/18/2021 02:00 PM    Chloride 103 10/18/2021 02:00 PM    CO2 18 (L) 10/18/2021 02:00 PM    Glucose 123 (H) 10/18/2021 02:00 PM    BUN 64 (H) 10/18/2021 02:00 PM    Creatinine 1.06 (H) 10/18/2021 02:00 PM    GFR est AA >60 10/18/2021 02:00 PM    GFR est non-AA 53 (L) 10/18/2021 02:00 PM    Calcium 8.6 10/18/2021 02:00 PM     Lab Results   Component Value Date/Time    Bilirubin, total 0.3 10/18/2021 02:00 PM    ALT (SGPT) 34 10/18/2021 02:00 PM    Alk. phosphatase 314 (H) 10/18/2021 02:00 PM    Protein, total 4.3 (L) 10/18/2021 02:00 PM    Albumin 1.0 (L) 10/18/2021 02:00 PM    Globulin 3.3 10/18/2021 02:00 PM         Records reviewed and summarized above. Pathology report(s) reviewed above. Radiology report(s) reviewed above. Assessment/PLAN:     1)  RIGHT fungating breast mass  Presumed advanced cancer  Tissue dx of cancer pending biopsy tmw  Staging scans pending. Would do brain MRI. Reviewed possible dx of breast cancer with pt in ER. Pt has uncertain acceptance of possible cancer dx. Will await path and scans for further discussion. Treatment options pending.    Case d/w breast surgery and ER attending today. 2) leukocytosis. Likely reactive. Monitor. 3) hypotension. Per IM. Likely associated with advanced cancer. 4) psychosocial.  Mood ok. Will need palliative care/ SW/ case mgmt support. We will follow   Call if questions    I appreciate the opportunity to participate in Ms. SILVA[facility] care.     Signed By: Allyson Presley DO

## 2021-10-18 NOTE — CONSULTS
General Surgery ER Consultation    Admit Date: 10/18/2021  Reason for Consultation: breast mass    HPI:  Bryson Hines is a 57 y.o. female presents to the ED w/ a fungating breast mass on the R that has been going on for 3 years. She has been treating it w/ deworming medication and other holistic approaches. She has lost 30lb in a month; little appetite. Has been \"hot and cold\"  WBC is 73K, she is hypotensive. Patient Active Problem List    Diagnosis Date Noted    Breast mass 10/18/2021     History reviewed. No pertinent past medical history. Past Surgical History:   Procedure Laterality Date    HX CATARACT REMOVAL        Social History     Tobacco Use    Smoking status: Never Smoker    Smokeless tobacco: Never Used   Substance Use Topics    Alcohol use: Not on file      History reviewed. No pertinent family history. Prior to Admission medications    Not on File     No Known Allergies       Subjective:     Review of Systems:    A comprehensive review of systems was negative except for that written in the History of Present Illness. Objective:     Blood pressure (!) 79/60, pulse 97, temperature 96.9 °F (36.1 °C), resp. rate 22, height 5' 6\" (1.676 m), weight 101 lb 10.1 oz (46.1 kg), SpO2 98 %.   Recent Results (from the past 24 hour(s))   CBC WITH AUTOMATED DIFF    Collection Time: 10/18/21  2:00 PM   Result Value Ref Range    WBC 73.6 (HH) 3.6 - 11.0 K/uL    RBC 4.51 3.80 - 5.20 M/uL    HGB 11.5 11.5 - 16.0 g/dL    HCT 34.5 (L) 35.0 - 47.0 %    MCV 76.5 (L) 80.0 - 99.0 FL    MCH 25.5 (L) 26.0 - 34.0 PG    MCHC 33.3 30.0 - 36.5 g/dL    RDW 16.6 (H) 11.5 - 14.5 %    PLATELET 562 (H) 990 - 400 K/uL    MPV 9.4 8.9 - 12.9 FL    NRBC 0.0 0  WBC    ABSOLUTE NRBC 0.00 0.00 - 0.01 K/uL    NEUTROPHILS 93 (H) 32 - 75 %    BAND NEUTROPHILS 2 0 - 6 %    LYMPHOCYTES 2 (L) 12 - 49 %    MONOCYTES 1 (L) 5 - 13 %    EOSINOPHILS 0 0 - 7 %    BASOPHILS 0 0 - 1 %    METAMYELOCYTES 1 (H) 0 %    MYELOCYTES 1 (H) 0 %    IMMATURE GRANULOCYTES 0 %    ABS. NEUTROPHILS 69.9 (H) 1.8 - 8.0 K/UL    ABS. LYMPHOCYTES 1.5 0.8 - 3.5 K/UL    ABS. MONOCYTES 0.7 0.0 - 1.0 K/UL    ABS. EOSINOPHILS 0.0 0.0 - 0.4 K/UL    ABS. BASOPHILS 0.0 0.0 - 0.1 K/UL    ABS. IMM. GRANS. 0.0 K/UL    DF MANUAL      RBC COMMENTS ANISOCYTOSIS  1+        RBC COMMENTS WILEY CELLS  PRESENT        RBC COMMENTS MICROCYTOSIS  1+        RBC COMMENTS OVALOCYTES  PRESENT        WBC COMMENTS Pathology Review Requested     METABOLIC PANEL, COMPREHENSIVE    Collection Time: 10/18/21  2:00 PM   Result Value Ref Range    Sodium 128 (L) 136 - 145 mmol/L    Potassium 5.2 (H) 3.5 - 5.1 mmol/L    Chloride 103 97 - 108 mmol/L    CO2 18 (L) 21 - 32 mmol/L    Anion gap 7 5 - 15 mmol/L    Glucose 123 (H) 65 - 100 mg/dL    BUN 64 (H) 6 - 20 MG/DL    Creatinine 1.06 (H) 0.55 - 1.02 MG/DL    BUN/Creatinine ratio 60 (H) 12 - 20      GFR est AA >60 >60 ml/min/1.73m2    GFR est non-AA 53 (L) >60 ml/min/1.73m2    Calcium 8.6 8.5 - 10.1 MG/DL    Bilirubin, total 0.3 0.2 - 1.0 MG/DL    ALT (SGPT) 34 12 - 78 U/L    AST (SGOT) 36 15 - 37 U/L    Alk. phosphatase 314 (H) 45 - 117 U/L    Protein, total 4.3 (L) 6.4 - 8.2 g/dL    Albumin 1.0 (L) 3.5 - 5.0 g/dL    Globulin 3.3 2.0 - 4.0 g/dL    A-G Ratio 0.3 (L) 1.1 - 2.2     SAMPLES BEING HELD    Collection Time: 10/18/21  2:00 PM   Result Value Ref Range    SAMPLES BEING HELD 1RED 1BLU     COMMENT        Add-on orders for these samples will be processed based on acceptable specimen integrity and analyte stability, which may vary by analyte.    LACTIC ACID    Collection Time: 10/18/21  2:00 PM   Result Value Ref Range    Lactic acid 1.9 0.4 - 2.0 MMOL/L   PROCALCITONIN    Collection Time: 10/18/21  2:00 PM   Result Value Ref Range    Procalcitonin 0.98 ng/mL   EKG, 12 LEAD, INITIAL    Collection Time: 10/18/21  2:23 PM   Result Value Ref Range    Ventricular Rate 96 BPM    Atrial Rate 96 BPM    P-R Interval 126 ms    QRS Duration 74 ms    Q-T Interval 330 ms    QTC Calculation (Bezet) 416 ms    Calculated P Axis 66 degrees    Calculated R Axis -122 degrees    Calculated T Axis 79 degrees    Diagnosis       ** Poor data quality, interpretation may be adversely affected Normal sinus   rhythm  Right atrial enlargement  Right superior axis deviation  Nonspecific ST abnormality  Abnormal ECG  No previous ECGs available  Confirmed by Tesfaye Vincent M.D., Mai Casas ([id number]) on 10/18/2021 2:34:57 PM     URINALYSIS W/MICROSCOPIC    Collection Time: 10/18/21  2:40 PM   Result Value Ref Range    Color DARK YELLOW      Appearance CLEAR CLEAR      Specific gravity 1.022 1.003 - 1.030      pH (UA) 5.5 5.0 - 8.0      Protein TRACE (A) NEG mg/dL    Glucose Negative NEG mg/dL    Ketone TRACE (A) NEG mg/dL    Bilirubin Negative NEG      Blood Negative NEG      Urobilinogen 0.2 0.2 - 1.0 EU/dL    Nitrites Negative NEG      Leukocyte Esterase MODERATE (A) NEG      WBC 10-20 0 - 4 /hpf    RBC 0-5 0 - 5 /hpf    Epithelial cells FEW FEW /lpf    Bacteria Negative NEG /hpf    Mucus 1+ (A) NEG /lpf    Hyaline cast 2-5 0 - 5 /lpf    Other: Renal Epithelial cells Present       _____________________  Physical Exam:     General:  Alert, cooperative, no distress, appears stated age, thin   Eyes:   Sclera clear. Throat: Lips, mucosa, and tongue normal.   Neck: Supple, symmetrical, trachea midline. Lungs:   Fungating mass on R w/ brown substance which is the parasite medication, purulent drainage present; also an open area mid anterior chest w/ gray-tan drainage, foul- smelling drainage   Heart:  Regular rate and rhythm. Abdomen:   Soft, non-tender. Bowel sounds normal. No masses,  No organomegaly. Extremities: Extremities normal, atraumatic, no cyanosis or edema.    Skin: See above           Assessment:   Active Problems:    Breast mass (10/18/2021)            Plan:     Have breast surgeon see her  Will need breast bx  CT to assess for further dz  Medicine admit  abx  IV hydration    NOTE PER DR MACIEJ MCKEON:  Pt examined, discussed and reviewed with NP, and questions answered with pt, and I agree/ concur with note NP     HPI:  See above, 3 yr hx enlarging ulcerating right breast mass, pt treating with \"DEWORMING \" med at home  ROS  See above   Physical Exam  Pt alert , and very large ulcerating right breast fungating mass, no left breast masses  Active Problems:    Breast mass (10/18/2021)         REC:   Likely uncontrolled locally advanced, ? Mets, right breast cancer. CT Chest pnd. Will need breast surgery consult,  bx right breast and oncology for discussion med onc rx, non surgical for now, likely cannot even be resected at this point. Reviewed with pt     Face-to-face time including review of any indicated imaging, discussion with patient, and other providers, exam and discussion with patient:     30         Minutes      Total time spent with patient: 30 minutes.     Signed By: Isaias Sotelo NP     October 18, 2021

## 2021-10-18 NOTE — ED TRIAGE NOTES
RUTH ANN NOTE:  Patient arrives from home with large wounds to her right breast.  Wounds are very large, draining, with foul odor. She has not seen a doctor is 36 years, she has been practicing holistic medicine for this right breast issues.

## 2021-10-18 NOTE — Clinical Note
Status[de-identified] INPATIENT [101]   Type of Bed: Remote Telemetry [29]   Cardiac Monitoring Required?: Yes   Inpatient Hospitalization Certified Necessary for the Following Reasons: 3.  Patient receiving treatment that can only be provided in an inpatient setting (further clarification in H&P documentation)   Admitting Diagnosis: Breast mass [[id number]]   Admitting Physician: Magali Chavez [[id number]]   Attending Physician: Mateus Patrick   Estimated Length of Stay: 3-4 Midnights   Discharge Plan[de-identified] Home with Office Follow-up
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
## 2021-10-18 NOTE — PROGRESS NOTES
Pharmacist Note - Vancomycin Dosing    Consult provided for this 57 y.o. female for indication of sepsis 2/2 ulcerated right breast/chest wall mass . Antibiotic regimen(s): Vanc + Zosyn  Patient on vancomycin PTA? NO     Recent Labs     10/18/21  1400   WBC 73.6*   CREA 1.06*   BUN 64*     Frequency of BMP: daily x 3  Height: 167.6 cm  Weight: 46.1 kg  Est CrCl: 40 ml/min; UO: -- ml/kg/hr  Temp (24hrs), Av.9 °F (36.1 °C), Min:96.9 °F (36.1 °C), Max:96.9 °F (36.1 °C)    Cultures:  10/18 blood & urine - pending    MRSA Swab ordered (if applicable)? NO    The plan below is expected to result in a target range of AUC/MARKO 400-500    Therapy will be initiated with a loading dose of 1000 mg IV x 1 to be followed by a maintenance dose of 1000 mg IV every 24 hours. Pharmacy to follow patient daily and order levels / make dose adjustments as appropriate. *Vancomycin has been dosed used Bayesian kinetics software to target an AUC/MARKO of 400-600, which provides adequate exposure for an assumed infection due to MRSA with an MARKO of 1 or less while reducing the risk of nephrotoxicity as seen with traditional trough based dosing goals.

## 2021-10-18 NOTE — ED PROVIDER NOTES
HPI         58y F here with a chest wall mass/wound. Says it started 3 years ago. She thought it was parasites so attempted to \"deworm\" but this didn't help. Saw a kinesiologist but this didn't help. She thinks this is due to her house mate jovanny Sorto all over the place. No pain. Wound has gotten progressively large and has discharge. Today a friend called EMS for her. BP reportedly 50's on arrival but came up to 90's with 500mL NS. No fever. No nausea or vomiting. No shortness of breath. No chest pain. No past medical history on file. No past surgical history on file. No family history on file. Social History     Socioeconomic History    Marital status: Not on file     Spouse name: Not on file    Number of children: Not on file    Years of education: Not on file    Highest education level: Not on file   Occupational History    Not on file   Tobacco Use    Smoking status: Not on file   Substance and Sexual Activity    Alcohol use: Not on file    Drug use: Not on file    Sexual activity: Not on file   Other Topics Concern    Not on file   Social History Narrative    Not on file     Social Determinants of Health     Financial Resource Strain:     Difficulty of Paying Living Expenses:    Food Insecurity:     Worried About Running Out of Food in the Last Year:     [address] in the Last Year:    Transportation Needs:     Lack of Transportation (Medical):      Lack of Transportation (Non-Medical):    Physical Activity:     Days of Exercise per Week:     Minutes of Exercise per Session:    Stress:     Feeling of Stress :    Social Connections:     Frequency of Communication with Friends and Family:     Frequency of Social Gatherings with Friends and Family:     Attends Alevism Services:     Active Member of Clubs or Organizations:     Attends Club or Organization Meetings:     Marital Status:    Intimate Partner Violence:     Fear of Current or Ex-Partner:     Emotionally Abused:     Physically Abused:     Sexually Abused: ALLERGIES: Patient has no known allergies. Review of Systems   Review of Systems   Constitutional: (-) weight loss. HEENT: (-) stiff neck   Eyes: (-) discharge. Respiratory: (-) cough. Cardiovascular: (-) syncope. Gastrointestinal: (-) blood in stool. Genitourinary: (-) hematuria. Musculoskeletal: (-) myalgias. Neurological: (-) seizure. Skin: (-) petechiae  Lymph/Immunologic: (-) enlarged lymph nodes  All other systems reviewed and are negative. There were no vitals filed for this visit. Physical Exam Nursing note and vitals reviewed. Constitutional: oriented to person, place, and time. appears well-developed and well-nourished. No distress. Head: Normocephalic and atraumatic. Sclera anicteric  Nose: No rhinorrhea  Mouth/Throat: Oropharynx is clear and moist. Pharynx normal  Eyes: Conjunctivae are normal. Pupils are equal, round, and reactive to light. Right eye exhibits no discharge. Left eye exhibits no discharge. Neck: Painless normal range of motion. Neck supple. No LAD. Cardiovascular: Normal rate, regular rhythm, normal heart sounds and intact distal pulses. Exam reveals no gallop and no friction rub. No murmur heard. Pulmonary/Chest:  No respiratory distress. No wheezes. No rales. No rhonchi. No increased work of breathing. No accessory muscle use. Good air exchange throughout. Large mass on the R chest wall with skin breakdown and foul smelling discharge. No surrounding erythema. Abdominal: soft, non-tender, no rebound or guarding. No hepatosplenomegaly. Normal bowel sounds throughout. Back: no tenderness to palpation, no deformities, no CVA tenderness  Extremities/Musculoskeletal: Normal range of motion. no tenderness. No edema. Distal extremities are neurovasc intact. Lymphadenopathy:   No adenopathy. Neurological:  Alert and oriented to person, place, and time. Coordination normal. CN 2-12 intact. Motor and sensory function intact. Skin: Skin is warm and dry. No rash noted. No pallor. MDM 61y F here with large chest wall mass. Suspect malignancy. Will need labs, fluids, abx, admission. Procedures    ED EKG interpretation:  Rhythm: normal sinus rhythm; and regular . Rate (approx.): 96; Axis: normal; P wave: normal; QRS interval: normal ; ST/T wave: normal;  This EKG was interpreted by Cary Vogt MD,ED Provider. Perfect Serve Consult for Admission  4:13 PM    ED Room Number: ER07/07  Patient Name and age: Drew Aguilar 57 y.o.  female  Working Diagnosis:   1. Chest wall mass    2. Hypotension, unspecified hypotension type    3. Hyponatremia        COVID-19 Suspicion:  no  Sepsis present:  no  Reassessment needed: N/A  Code Status:  Full Code  Readmission: no  Isolation Requirements:  no  Recommended Level of Care:  med/surg  Department:[facility] Adult ED - 21   Other:  61y F here with chest wall mass - likely fungating breast CA. Ongoing for a few years but never treated. Presented hypotensive but normal HR and normal mentation. Lactate ok. Broad spectrum abx started and cultures sent. Getting CT C/A/P. Seen by Dr. Gorge Ambrosio (breast surgery) in the ED and she will call oncology as well.

## 2021-10-18 NOTE — CONSULTS
[facility]    ICU TEAM Consult Note    Name: Cong Braun   : 1960   MRN: [id number]   Date: 10/18/2021      Assessment:     ICU Problems:    1. Breast mass  2. Severe Sepsis  3. Hyponatremia  4. ANATOLIY      Imaging:  CT Results  (Last 48 hours)               10/18/21 1644  CT CHEST W CONT Final result    Impression:      1. Large, ulcerated right breast/chest wall mass with associated intercostal   involvement and probable pleural involvement as discussed in detail above. Associated right axillary adenopathy as well as vascular collateralization. 2. Abnormal appearance of the right first and third ribs, and small sclerotic   lesion in the upper manubrium. 3. Ill-defined groundglass nodularity right upper and lower lobes as well as   nodular pleural and major fissure thickening which could be   infectious/inflammatory or neoplastic. Minimal groundglass opacities left lung. 4. Subcentimeter right hepatic hypodensity too small to characterize,   indeterminate. Narrative:  INDICATION: chest wall mass       EXAM:  CT CHEST, ABDOMEN, PELVIS WITH CONTRAST       COMPARISON: Chest radiograph earlier today       TECHNIQUE:  CT imaging of the chest, abdomen and pelvis was performed after the   uneventful intravenous administration of contrast material.  Coronal and   sagittal reconstructions were generated. CT dose reduction was achieved through   use of a standardized protocol tailored for this examination and automatic   exposure control for dose modulation. FINDINGS:       THYROID: Unremarkable. MEDIASTINUM/SAM: Subcentimeter precarinal and right hilar lymph nodes. HEART/PERICARDIUM: Unremarkable. LUNGS/PLEURA: Vague and slightly nodular groundglass opacities in the posterior   right upper lobe and superior right lower lobe, with nodular thickening of the   right major fissure. Minimal groundglass opacity lateral and posterior left   upper lobe. No pleural effusion.  No pneumothorax pleural-based 8mm nodular   density lateral right upper lobe. BONES: Large, ulcerated and hypervascular mass occupying majority of the right   breast and axilla and extending inferiorly in the lateral chest wall, measuring   in total 18 x 5 x 15 cm. Associated right axillary masses/lymph nodes measuring   between 13 and 15 mm. Mass extends into the intercostal space between the second   and third, third and fourth, fourth and fifth, fifth and sixth and possibly   sixth and seventh ribs. Difficult to exclude pleural involvement particularly in   the anterior right upper lung between the second and fourth ribs. Irregular   lucency and sclerosis involving the anterior right first rib as well as anterior   third rib. Small sclerotic focus in the superior aspect of the manubrium   extensive vascular collateralization in the right chest wall and axilla. LIVER: 7 mm round hypodensity segment 6 of the liver is too small to   characterize. GALLBLADDER: Unremarkable. SPLEEN: No enlargement or lesion. PANCREAS: No mass or ductal dilatation. ADRENALS: No mass. KIDNEYS: No mass, calculus, or hydronephrosis. GI TRACT: No evidence of bowel obstruction. Sigmoid diverticulosis without   diverticulitis. Nonobstructing intussusception involving jejunal loops in the   left midabdomen   PERITONEUM: No free air or free fluid. APPENDIX: Unremarkable. RETROPERITONEUM: No aortic aneurysm. LYMPH NODES: None enlarged. ADDITIONAL COMMENTS: N/A.       URINARY BLADDER: No mass or calculus. LYMPH NODES: None enlarged. REPRODUCTIVE ORGANS: Uterus is present. FREE FLUID: Small amount. BONES: No destructive bone lesion. ADDITIONAL COMMENTS: N/A.           10/18/21 1644  CT ABD PELV W CONT Final result    Impression:      1. Large, ulcerated right breast/chest wall mass with associated intercostal   involvement and probable pleural involvement as discussed in detail above.    Associated right axillary adenopathy as well as vascular collateralization. 2. Abnormal appearance of the right first and third ribs, and small sclerotic   lesion in the upper manubrium. 3. Ill-defined groundglass nodularity right upper and lower lobes as well as   nodular pleural and major fissure thickening which could be   infectious/inflammatory or neoplastic. Minimal groundglass opacities left lung. 4. Subcentimeter right hepatic hypodensity too small to characterize,   indeterminate. Narrative:  INDICATION: chest wall mass       EXAM:  CT CHEST, ABDOMEN, PELVIS WITH CONTRAST       COMPARISON: Chest radiograph earlier today       TECHNIQUE:  CT imaging of the chest, abdomen and pelvis was performed after the   uneventful intravenous administration of contrast material.  Coronal and   sagittal reconstructions were generated. CT dose reduction was achieved through   use of a standardized protocol tailored for this examination and automatic   exposure control for dose modulation. FINDINGS:       THYROID: Unremarkable. MEDIASTINUM/SAM: Subcentimeter precarinal and right hilar lymph nodes. HEART/PERICARDIUM: Unremarkable. LUNGS/PLEURA: Vague and slightly nodular groundglass opacities in the posterior   right upper lobe and superior right lower lobe, with nodular thickening of the   right major fissure. Minimal groundglass opacity lateral and posterior left   upper lobe. No pleural effusion. No pneumothorax pleural-based 8mm nodular   density lateral right upper lobe. BONES: Large, ulcerated and hypervascular mass occupying majority of the right   breast and axilla and extending inferiorly in the lateral chest wall, measuring   in total 18 x 5 x 15 cm. Associated right axillary masses/lymph nodes measuring   between 13 and 15 mm. Mass extends into the intercostal space between the second   and third, third and fourth, fourth and fifth, fifth and sixth and possibly   sixth and seventh ribs.  Difficult to exclude pleural involvement particularly in   the anterior right upper lung between the second and fourth ribs. Irregular   lucency and sclerosis involving the anterior right first rib as well as anterior   third rib. Small sclerotic focus in the superior aspect of the manubrium   extensive vascular collateralization in the right chest wall and axilla. LIVER: 7 mm round hypodensity segment 6 of the liver is too small to   characterize. GALLBLADDER: Unremarkable. SPLEEN: No enlargement or lesion. PANCREAS: No mass or ductal dilatation. ADRENALS: No mass. KIDNEYS: No mass, calculus, or hydronephrosis. GI TRACT: No evidence of bowel obstruction. Sigmoid diverticulosis without   diverticulitis. Nonobstructing intussusception involving jejunal loops in the   left midabdomen   PERITONEUM: No free air or free fluid. APPENDIX: Unremarkable. RETROPERITONEUM: No aortic aneurysm. LYMPH NODES: None enlarged. ADDITIONAL COMMENTS: N/A.       URINARY BLADDER: No mass or calculus. LYMPH NODES: None enlarged. REPRODUCTIVE ORGANS: Uterus is present. FREE FLUID: Small amount. BONES: No destructive bone lesion. ADDITIONAL COMMENTS: N/A. ICU Comprehensive Plan of Care:     Plans for this Shift:     1. Empiric antibiotics with zosyn and vancomycin  2. Blood cultures pending  3. General surgery consulted  4. Breast surgery consulted  5. Punch biopsy with oncology in AM  6. IV fluids  7. Trend labs  8. Nurses on remote tele unit uncomfortable with BP and refusing to accept patient, MAP 67, will watch in ICU tonight, discussed with Dr. Yuki Hernandez and if patient does not require pressors he will take patient to tele unit. 9. Patient is \"naturopathic\" but agrees to pressors for 24-48 hours and will think about a central line if needed  10. COVID Treatment:  a. No symptoms, Not vaccinated, does not wish to be vaccinated  11. SBP Goal of: > 90 mmHg  12. MAP Goal of: > 65 mmHg  13.  None - For above SBP/MAP goals  14. IVFs: NS  15. Transfusion Trigger (Hgb): <7 g/dL  16. Respiratory Goals:  a. Incentive spirometry  17. Pulmonary toilet: Incentive Spirometry   18. SpO2 Goal: > 92%  19. Keep K>4; Mg>2   20. PT/OT: Will consult if needed   21. Goals of Care Discussion with family Yes   25. Plan of Care/Code Status: Do Not Resuscitate  23. Appreciate Consultants Input   24. Discussed Care Plan with Bedside RN  25. Documentation of Current Medications  26. Rest of Plan Below:    F - Feeding:  Yes   A - Analgesia: Acetaminophen  S - Sedation: None  T - DVT Prophylaxis: SCD's or Sequential Compression Device   H - Head of Bed: > 30 Degrees  U - Ulcer Prophylaxis: Not at this time   G - Glycemic Control: Insulin  S - Spontaneous Breathing Trial: N/A  B - Bowel Regimen: None needed at this time  I - Indwelling Catheter:   Tubes: None  Lines: Peripheral IV  Drains: None  D - De-escalation of Antibiotics: Vancomycin  Zosyn    Subjective:   Progress Note: 10/18/2021      Reason for ICU Admission: Severe Sepsis     HPI: This is a 57year old female who has not seen a health care provider for over 20 years. 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 She states about 3-4 years ago she started to \"gently de-worm\" with herbal remedies as she states that parasites in the intestines cause most diseases. About 4 months after starting this herbal regimen, she noticed a lump in her breast. It was at this time her \"naturopathic doctor\" recommended she be more aggressive with her de-worming. She states that \"apparently it didn't work because they have now gotten into my lymph nodes and I believe that it was is causing this, they got into the lymph node and cause it to bust\" she states she has been keeping the area clean and taking stronger de-wormer to clear her lymph nodes.  She states she was also taking garlic but her stomach could not tolerate it, and her room mate was spraying febreze in the house, which she believes caused her condition to worsen. She states \"the 80 chemicals in febreze have never been tested on humans, and I believe this has poisoned me\". Her room mate called EMS today to have her open area on her right breat looked at. The patient is agreeable to antibiotics, but would like to use \"natural alternatives\" if they are available. She would like to supplement her regimen with garlic. POD:  * No surgery found *    S/P:       Active Problem List:     Problem List  Never Reviewed        Codes Class    Breast mass ICD-10-CM: N63.0  ICD-9-CM: 611.72               Past Medical History:      has no past medical history on file. Past Surgical History:      has a past surgical history that includes hx cataract removal.    Home Medications:     Prior to Admission medications    Not on File       Allergies/Social/Family History:     No Known Allergies   Social History     Tobacco Use    Smoking status: Never Smoker    Smokeless tobacco: Never Used   Substance Use Topics    Alcohol use: Not on file      History reviewed. No pertinent family history. Review of Systems:     Pertinent items are noted in HPI.     Objective:   Vital Signs:  Visit Vitals  BP (!) 83/53   Pulse 99   Temp 96.9 °F (36.1 °C)   Resp 20   Ht 5' 6\" (1.676 m)   Wt 46.1 kg (101 lb 10.1 oz)   SpO2 98%   BMI 16.40 kg/m²      O2 Device: None (Room air) Temp (24hrs), Av.9 °F (36.1 °C), Min:96.9 °F (36.1 °C), Max:96.9 °F (36.1 °C)           Intake/Output:     Intake/Output Summary (Last 24 hours) at 10/18/2021 1818  Last data filed at 10/18/2021 1708  Gross per 24 hour   Intake 1383 ml   Output    Net 1383 ml       Physical Exam:  General appearance: alert, cooperative, no distress, appears stated age, ill-appearing   Lungs: clear to auscultation bilaterally  Breasts: Large right breast wound- please see picture in chart  Heart: regular rate and rhythm, S1, S2 normal, no murmur, click, rub or gallop  Abdomen: soft, non-tender. Bowel sounds normal. No masses,  no organomegaly  Extremities: extremities normal, atraumatic, no cyanosis or edema  Skin: Skin color, texture, turgor normal. No rashes or lesions  Neurologic: Grossly normal  Cap refill: Brisk, less than 3 seconds  Pulses: 2+, symmetric in all extremities                                                          LABS AND  DATA: Personally reviewed  Recent Labs     10/18/21  1400   WBC 73.6*   HGB 11.5   HCT 34.5*   *     Recent Labs     10/18/21  1400   *   K 5.2*      CO2 18*   BUN 64*   CREA 1.06*   *   CA 8.6     Recent Labs     10/18/21  1400   *   TP 4.3*   ALB 1.0*   GLOB 3.3     No results for input(s): INR, PTP, APTT, INREXT in the last 72 hours. No results for input(s): PHI, PCO2I, PO2I, FIO2I in the last 72 hours. No results for input(s): CPK, CKMB, TROIQ, BNPP in the last 72 hours. Hemodynamics:   PAP:   CO:     Wedge:   CI:     CVP:    SVR:       PVR:       Ventilator Settings:  Mode Rate Tidal Volume Pressure FiO2 PEEP                    Peak airway pressure:      Minute ventilation:          MEDS: Reviewed    Chest X-Ray:  CXR Results  (Last 48 hours)               10/18/21 1502  XR CHEST PORT Final result    Impression:  No acute cardiopulmonary disease radiographically. . Large right chest wall   mass. .           Narrative:  INDICATION: Right breast/chest wall mass, hypotension. Patient has had mass for   3 years, with homeopathic/holistic treatment. EXAM: Chest single view. COMPARISON: None. Duncan Tellez FINDINGS: A single frontal view of the chest at 1455 hours shows clear lungs. Increased density projecting over the right lung base is thought to be related   to the right breast/chest wall mass. The heart, mediastinum and pulmonary   vasculature are normal. The left lung is clear. The bony thorax is unremarkable   for age. Duncan Tellez ECHO:      Multidisciplinary Rounds Completed:  Pending    ABCDEF Bundle/Checklist Completed:  Yes    SPECIAL EQUIPMENT  None    DISPOSITION  Transfer to non-ICU bed    CRITICAL CARE CONSULTANT NOTE  I had a face to face encounter with the patient, reviewed and interpreted patient data including clinical events, labs, images, vital signs, I/O's, and examined patient. I have discussed the case and the plan and management of the patient's care with the consulting services, the bedside nurses and the respiratory therapist.      NOTE OF PERSONAL INVOLVEMENT IN CARE   This patient has a high probability of imminent, clinically significant deterioration, which requires the highest level of preparedness to intervene urgently. I participated in the decision-making and personally managed or directed the management of the following life and organ supporting interventions that required my frequent assessment to treat or prevent imminent deterioration. I personally spent 30 minutes of critical care time. This is time spent at this critically ill patient's bedside actively involved in patient care as well as the coordination of care and discussions with the patient's family. This does not include any procedural time which has been billed separately.       Merlinda Hughs [facility]     Critical Care Medicine  [facility]

## 2021-10-19 NOTE — HOSPICE
[facility]  [phone]    Inpatient Nursing Admission   Patient Name: Ruth Swain  YOB: 1960  Age: 57 y.o. Date of Hospice Admission: 10/19/2021  Hospice Attending Elected by Patient: Gina Husain MD  Primary Care Physician: Nahomy Krishnan MD  Admitting RN: Colt Peng RN  : PEDRO Malhotra     Level of Care (GIP/Routine/Respite): GIP  Facility of Care: [facility]  Patient Room: [address] [place] [address]     HOSPICE SUMMARY   ER Visits/ Hospitalizations in past year: 1  Hospice Diagnosis: Breast mass [N63.0]  Onset Date of Hospice Diagnosis: 10/19/2021  Summary of Disease Progression Leading to Hospice Diagnosis:   [address]:   Per ICU note: HPI: This is a 57year old female who has not seen a health care provider for over 20 years. 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 She states about 3-4 years ago she started to \"gently de-worm\" with herbal remedies as she states that parasites in the intestines cause most diseases. About 4 months after starting this herbal regimen, she noticed a lump in her breast. It was at this time her \"naturopathic doctor\" recommended she be more aggressive with her de-worming. She states that \"apparently it didn't work because they have now gotten into my lymph nodes and I believe that it was is causing this, they got into the lymph node and cause it to bust\" she states she has been keeping the area clean and taking stronger de-wormer to clear her lymph nodes. She states she was also taking garlic but her stomach could not tolerate it, and her room mate was spraying febreze in the house, which she believes caused her condition to worsen. She states \"the 80 chemicals in febreze have never been tested on humans, and I believe this has poisoned me\".  Her room mate called EMS today to have her open area on her right breat looked at. The patient is agreeable to antibiotics, but would like to use \"natural alternatives\" if they are available. She would like to supplement her regimen with garlic. Today (10/19/2021) patient has decided she no longer wants to continue with treatment and would like to be placed on comfort care measures and go on hospice.         Hospitalist note:   10/19: Seen and examined patient sitting up in bed. States she is feeling ok. States that she does not want any treatment. She would like a only wants a better quality of life rather than quantity, asking for hospice services. Chart reviewed. Patient to transfer out of ICU today. Comfort measures only. I have offered to call her roommate or sister (who is local) or brother (who lives in [facility]) to provide them with an update on her condition and plan of care. She states that she has been keeping in contact with them and does not want me to call.      1530: Patient to be admitted into inpatient hospice services            DISCHARGE DIAGNOSES / PLAN:          Patient to be admitted into inpatient hospice services         Breast Mass   - Admit patient   - . Large, ulcerated right breast/chest wall mass with associated intercostal  involvement and probable pleural involvement as discussed in detail above. Associated right axillary adenopathy as well as vascular collateralization. 2. Abnormal appearance of the right first and third ribs, and small sclerotic  lesion in the upper manubrium.  - WBC 73-> 42.8  - GS consulted   - Breast surgery consulted   - Oncology consulted- For punch biopsy in am  - Patient requesting to be made comfort care only.  Does not want to continue with any treatment.       Severe sepsis   - Likely secondary to above   - as evidence by WBC 73.5, HR 99, SBP <90, C02 18  - BC pending   -  Van and Zosyn started- Patient requested to be stopped      Hyponatremia   -      ANATOLIY   - Creatinine , baseline unknown     Hyperkalemia   - potassium 5.2     Hospice eval/admission:  Patient agrees with hospice admission to manage pain and wounds. She will likely decline quickly. She wishes for no aggressive treatment. Dr Afshan Lynn gave CTI for breast cancer/sepsis. Will scheduled IV morphine for pain. Frequent nursing assessments to adjust and titrate medications as needed to manage symptoms  Hospice SW has assisted patient in filling out FAP paperwork. Co-Morbidities:   Patient Active Problem List   Diagnosis Code    Breast mass N63.0     Diagnoses RELATED to the terminal prognosis: sepsis, hyponatremia, ANATOLIY  Other Diagnoses:     Rationale for a prognosis of life expectancy of 6 months or less if the disease follows its normal course (Disease Specific History): Rodolfo Jones is a 57 y.o. who was admitted to [facility]. The patient's principle diagnosis of breast cancer  has resulted in pursuit of hospice. Functionally, the patient's Palliative Performance Scale has declined over a period of weeks and is estimated at 20. Objective information that support this patients limited prognosis includes: large ulcerated breast /chest wall mass with intercostal involvement, malnutrition, hypotension, severe sepsis. The patient/family chose comfort measures with the support of Hospice. Patient meets for GIP  LOC as evidenced by: requiring frequent nursing assessments to adjust medications for comfort, requiring IV morphine to manage pain , very hypotensive and declining use of pressors to maintain blood pressure.       Prognosis estimated based on 10/19/21 clinical assessment is:   [] Few to Many Hours  [] Hours to Days   [x] Few to Many Days   [] Days to Weeks   [] Few to Many Weeks   [] Weeks to Months   [] Few to Many Months    ASSESSMENT    Patient self-reports:  [x]  Yes    [] No    SYMPTOMS: pain, weakness, anorexia  SIGNS/PHYSICAL FINDINGS: large fungating mass to right breast, chest, axilla, eating very little, cachectic, weak, pain requiring IV morphine, bedbound . KARNOFSKY:20    FAST for all dementia:      Learning Assessment:  Patient  Is patient willing/able to learn? Unable to care for self  What is the highest level of education completed? Learning preference (written material, demonstration, visual)? Learning barriers (ESOL, Circle, poor vision)? Caregiver  Is caregiver willing to learn care for patient? No caregiver  What is the highest level of education completed? Learning preference (written material, demonstration, visual)? Learning barriers (ESOL, Circle, poor vision)? CLINICAL INFORMATION     Wt Readings from Last 3 Encounters:   10/19/21 48 kg (105 lb 13.1 oz)     Ht Readings from Last 3 Encounters:   10/18/21 5' 6\" (1.676 m)     There is no height or weight on file to calculate BMI. There were no vitals taken for this visit. LAB VALUES  No results found for this visit on 10/19/21 (from the past 12 hour(s)). No results found for this visit on 10/19/21 (from the past 6 hour(s)). Lab Results   Component Value Date/Time    Protein, total 3.6 (L) 10/19/2021 06:35 AM    Albumin 1.4 (L) 10/19/2021 06:35 AM       Currently this patient has:  [] Supplemental O2 [x] Peripheral IV  [] PICC    [] PORT   [] Hayden Catheter [] NG Tube   [] PEG Tube [] Ostomy    [] AICD: Has ICD been deactivated? [] Yes [] No:______    PLAN     1. Education of patient and family regarding end of life care and Hospice plan of care  2. Provide education and support to unit staff caring for hospice patient and family. Provide staff with direct contact information to reach hospice team [phone]   3. Provide support and frequent rounds for patient comfort and safety ongoing  4. Provide  support ongoing, continue to discuss discharge plan if patient becomes ovidio and does not require acute nursing care interventions for GIP level of care  5. Provide  and bereavement support ongoing  6. Continue with current wound care orders  7. Schedule morphine 2mg IV every 4 hours  8. Add PRN comfort set for breakthrough symptoms  9. Maintain skin integrity as tolerated for hospice patient, turning and repositioning for comfort, and specialty mattress if appropriate  10. Hayden care per hospital policy for infection prevention  11. Central line care as per hospital policy for infection prevention      Hospice Team Frequency Orders:  Skilled Nurse -  Daily x 7 days /every other day x 7 days  with 5 PRN visits for symptom control. MSW  1 visit for initial assessment/evaluation for family support and need for volunteer services. Kyrie Blackman  1 visit for initial assessment/evaluation for spiritual support. ADVANCE CARE PLANNING (Complete in ACP Flow Sheet)   Code Status: DNR  Durable DNR: [x]  Yes  []  No  Code Status Discussed/Confirmed:yes  Preference for Other Life Sustaining Treatment Discussed/Confirmed:yes  Hospitalization Preference:[facility]  No flowsheet data found.  Service: [] Yes  [x]  No      [] Unknown  Appropriate for Pinning Ceremony:  [] Yes     [] No  Quaker: No Scientologist on file   Home: TBD    DISCHARGE PLANNING     1. Discharge Plan: Will likely  in inpatient setting, may transfer to [facility] when bed available under FAP  2. Patient/Family teaching: hospice philosophy, levels of care, and services to be provided. End of life symptoms and care. 3. Response to patient/family teaching: patient verbalized understanding    SOCIAL/EMOTIONAL/SPIRITUAL NEEDS     Spiritual Issues Identified: none on admission. Hospice and hospital chaplains available for emotional and spiritual support. Psych/ Social/ Emotional Issues Identified: no insurance, FAP has been filled out. Want cremation. SW to talk with her brother about  home.      Caregiver Support:  [x] Provided information on End of Life Care   [] Material Provided: Gone From My Sight or Journey's End     CARE COORDINATION Dr. Oralia Vergara contacted, discharge to hospice order received  Dr. Chacho Sierra contacted, agrees to serve as attending provider for hospice and provided verbal certification of terminal illness with life expectancy of 6 months or less. Orders for hospice admission, medications and plan of treatment received. Medication reconciliation completed.   MEDS: See medication list below  DME: Per hospital  Supplies: Per hospital  IDT communication to include MD, SN, SW, CH and support team    ALLERGIES AND MEDICATIONS     Allergies: No Known Allergies  Current Facility-Administered Medications   Medication Dose Route Frequency    LORazepam (ATIVAN) injection 1 mg  1 mg IntraVENous Q15MIN PRN    glycopyrrolate (ROBINUL) injection 0.2 mg  0.2 mg IntraVENous Q4H PRN    bisacodyL (DULCOLAX) suppository 10 mg  10 mg Rectal DAILY PRN    morphine injection 2 mg  2 mg IntraVENous Q15MIN PRN    acetaminophen (TYLENOL) tablet 650 mg  650 mg Oral Q4H PRN    Or    acetaminophen (TYLENOL) solution 650 mg  650 mg Oral Q4H PRN    Or    acetaminophen (TYLENOL) suppository 650 mg  650 mg Rectal Q4H PRN    morphine injection 2 mg  2 mg IntraVENous Q4H

## 2021-10-19 NOTE — WOUND CARE
WOCN Note:     New consult for breast wound. Chart shows:  Admitted for hypotension & breast wound  Lived independently    Assessment:   Appropriately conversational and reports pain on satellite chest areas around main erupting tumor  Medicated by RN, Claude Neve. Mobile and continent. Surface: gabriela MARIA ISABEL mattress    Bilateral heels buttocks, and sacrum intact and without erythema.       1. POA erupting, fungating right breast mass  From axilla to central chest  Satellites lesion appear to tunnel from larger mass and have malodorous yellow exudate that can be milked with palpation  Cleaned skin of exudate, applied silicone barrier cream to intact skin to protect from drainage  Applied Opticel AG to 3 satellite erosions for exudate management and secured with Mepitel One  Applied Mepilex transfer to lager area for gentle cover    Wound photo pulled from media in chart:      Wound Recommendations:    Right breast mass: metrogel for odor control; cover with Mepilex transfer and change as needed  Breast Satellite lesions: Opticel AG changed as needed for exudate management; secure with Mepitel One  Silicone barrier cream (blue tube) to skin around lesions as needed to protect from exudate    Transition of Care: Plan to follow weekly and as needed while admitted to hospital.     Colby Gilbert, JAMILAN, RN, [facility]  Certified Wound, Ostomy, Continence Nurse  office [phone]  Available via [employer]

## 2021-10-19 NOTE — PROGRESS NOTES
1820-TRANSFER - OUT REPORT:    Verbal report given to Betzaida(name) on [facility]  being transferred to [facility](unit) for routine progression of care       Report consisted of patients Situation, Background, Assessment and   Recommendations(SBAR). Information from the following report(s) SBAR, Kardex, ED Summary, Intake/Output, MAR, Accordion, Recent Results, Med Rec Status, Cardiac Rhythm SR, Alarm Parameters  and Quality Measures was reviewed with the receiving nurse. Lines:       Opportunity for questions and clarification was provided.       Patient transported with:   Registered Nurse  Tech

## 2021-10-19 NOTE — PROGRESS NOTES
Spiritual Care Assessment/Progress Note  [facility]      NAME: Samantha Byers      MRN: [id number]  AGE: 57 y.o.  SEX: female  Tenriism Affiliation:    Language: English     10/19/2021     Total Time (in minutes): 80     Spiritual Assessment begun in [address] [facility] through conversation with:         [x]Patient        [] Family    [] Friend(s)        Reason for Consult: Initial/Spiritual assessment, critical care     Spiritual beliefs: (Please include comment if needed)     [] Identifies with a usha tradition:         [] Supported by a usha community:            [] Claims no spiritual orientation:           [] Seeking spiritual identity:                [x] Adheres to an individual form of spirituality:           [] Not able to assess:                           Identified resources for coping:      [] Prayer                               [] Music                  [] Guided Imagery     [x] Family/friends                 [] Pet visits     [] Devotional reading                         [] Unknown     [] Other:                                             Interventions offered during this visit: (See comments for more details)    Patient Interventions: Affirmation of emotions/emotional suffering, Affirmation of usha, Catharsis/review of pertinent events in supportive environment, Coping skills reviewed/reinforced, Guidance concerning next steps/process to be expected, Iconic (affirming the presence of God/Higher Power), Life review/legacy, Normalization of emotional/spiritual concerns           Plan of Care:     [] Support spiritual and/or cultural needs    [] Support AMD and/or advance care planning process      [] Support grieving process   [] Coordinate Rites and/or Rituals    [] Coordination with community clergy   [] No spiritual needs identified at this time   [] Detailed Plan of Care below (See Comments)  [] Make referral to Music Therapy  [] Make referral to Pet Therapy     [] Make referral to Addiction services  [] Make referral to [facility]  [] Make referral to Spiritual Care Partner  [] No future visits requested        [x] Follow up upon further referrals     Comments:  for initial visit in ICU. Pt welcomed visit. Pt shared about her beliefs, we talked about life and wishes. Pt shared about recognizing quality of life vs quantity. Appreciative of visit and support. Provided  listening and care. Talked with pt's RN for update and status. Let her know of  availability. Please contact [phone] [address] [facility] for further support.       [address] [place], MACE   [phone])

## 2021-10-19 NOTE — PROGRESS NOTES
Problem: Hospice Orientation  Goal: Demonstrate understanding of hospice philosophy, plan of care, and home hospice program  Description: The patient/family/caregiver will demonstrate understanding of hospice philosophy, plan of care and the home hospice program as evidenced by participation in meeting the patient's psychosocial, spiritual, medical, and physical needs inclusive of medical supplies/equipment focusing on symptoms. Outcome: Progressing Towards Goal     Problem: Potential for Alteration in Skin Integrity  Goal: Monitor skin for areas of alteration in skin integrity  Description: Patient/family/caregiver will demonstrate ability to care for patient's skin, monitor for areas of breakdown, and demonstrate methods to prevent breakdown during hospice care. Outcome: Progressing Towards Goal     Problem: Risk for Falls  Goal: Free of falls during inpatient stay  Description: Patient will be free of falls during inpatient stay. Outcome: Progressing Towards Goal     Problem: Alteration in Mobility  Goal: Remain as independent as possible and remain safe in environment  Description: Patient will remain as independent as possible and remain safe in their environment. Outcome: Progressing Towards Goal     Problem: Pain  Goal: Assess satisfaction of level of comfort and symptom control  Outcome: Progressing Towards Goal  Goal: *Control of acute pain  Outcome: Progressing Towards Goal     Problem: Anticipatory Grief  Goal: Explore reactions to and verbalize acceptance of impending loss  Description: Patient/family/caregiver will explore reactions to and verbalize acceptance of impending loss. Outcome: Progressing Towards Goal     Problem: Anxiety/Agitation  Goal: Verbalize or staff assess the ability to manage anxiety  Description: The patient/family/caregiver will verbalize and demonstrate ability to manage the patient's anxiety throughout hospice care.   Outcome: Progressing Towards Goal     Problem: Coping and Emotional Distress  Goal: Demonstrate acceptance of terminal illness and understanding of disease progression  Description: Patient/family/caregiver will demonstrate acceptance of terminal disease and understanding of disease progression while employing appropriate coping mechanisms. Outcome: Progressing Towards Goal     Problem: Pressure Injury - Risk of  Goal: *Prevention of pressure injury  Outcome: Progressing Towards Goal     Problem: Grieving  Goal: *Able to express feelings of grief  Outcome: Progressing Towards Goal  Goal: *Able to identify stages of grieving process  Outcome: Progressing Towards Goal     Problem: General Wound Care  Goal: *Non-infected wound: Improvement of existing wound, absence of infection, and maintenance of skin integrity  Outcome: Progressing Towards Goal  Goal: *Infected Wound: Prevention of further infection  Description: Infection control procedures (eg: clean dressings, clean gloves, hand washing, precautions to isolate wound from contamination, sterile instruments used for wound debridement) should be implemented. Outcome: Progressing Towards Goal  Goal: Interventions  Outcome: Progressing Towards Goal     Problem: End of Life Process  Goal: Demonstrate understanding of end of life processes  Description: Patient/caregiver will understand end of life processes.   Outcome: Progressing Towards Goal     Problem: Dyspnea Due to End of Life  Goal: Demonstrate understanding of and ability to manage respiratory symptoms at end of life  Outcome: Progressing Towards Goal     Problem: Imminent Death  Goal: Collaborate with patient/family/caregiver/interdisciplinary team to minimize and manage end of life symptoms  Outcome: Progressing Towards Goal

## 2021-10-19 NOTE — PROGRESS NOTES
Palliative Medicine  [place]: [phone]  [facility]: [phone]      Palliative consultation noted and appreciated- SW reviewed chart, noted that patient is already on comfort measures and hospice involvement noted- hospice RN meeting with patient actively. Palliative Medicine will sign-off, goals appear clear at this time with hospice involvement. Please re-consult Palliative Medicine for any specific needs or concerns. Thank you for including Palliative Medicine in the care of [facility].        Ava Gomez LCSW  [phone]

## 2021-10-19 NOTE — H&P
[facility]  [phone]    Patient Name: Peace Suarez  YOB: 1960    Date of Provider Hospice Visit: 10/19/21    Level of Care:   [x] General Inpatient (GIP)    [] Routine   [] Respite    Current Location of Care:  [x] [facility] [] [facility] [] [id number] [] [address] [] Hospice House [facility], patient referred from:  [] [facility] [] [facility] [] [address] [] [address] [] Home [] Other:     Date of Original Hospice Admission: 10/19/2021  Hospice Medical Director at time of admission: 53Cardiola Diagnosis: Metastatic breast cancer  Diagnoses RELATED to the terminal prognosis: Sepsis  Other Diagnoses:      Freddy Forte is a 57y.o. year old who was admitted to [address] [place] [address]. Patient is a 76-year-old female admitted to [facility] level care secondary to metastatic breast cancer. Patient admitted to [address] [place] [address] on 10/18/2021. Patient found to have a white count of 73,000 and not sought health care for over 20 years. She is found to have a large right breast mass that is fungating as well as advanced into the lymph nodes. She had been doing \"gently deworming \"/herbal medications. She was admitted and started on IV antibiotics and her white count did decrease to 43,000 but she elected not to pursue any further treatment and wanted to focus on comfort. CT scan showed large ulcerated right breast/chest wall mass with associated intercostal involvement, pleural involvement, rib involvement as well as concern about hepatic lesions. She also is found of a blood pressure in the 70s despite IV fluids, IV antibiotics. Given her fairly rapid decline, symptom burden, and high risk/unsafe transfer home/difficult caregiving situation, patient was admitted [facility] care for further management.     The patient's principle diagnosis has resulted in hypotension/pain/leukocytosis  Refer to LCD     Functionally, the patient's Karnofsky and/or Palliative Performance Scale has declined over a period of years and is estimated at 20 the patient is dependent on the following ADLs: Able to feed herself    Objective information that support this patients limited prognosis includes:   CT scansee note above. The patient/family chose comfort measures with the support of Hospice. HOSPICE DIAGNOSES   Active Symptoms:  1. Cancer associated pain  2. Large right breast wound that also extends into the sternal area  3. Hospice care  4. Leukocytosis     PLAN   1. Patient  to [facility] level care as patient needs frequent nursing assessment, IV medication management, not safe to transition home given her hypotension, and high risk for very rapid decline  2. Morphine 2 mg IV every 4 hours scheduled every 15 minutes as needed  3. Comfort meds for all other symptoms  4. Continue wound care with Metro[employer]el twice daily to the wound  5. Plan reviewed with bedside nursing team, hospice liaisonMonica, patient    6.  and SW to support family needs  7. Disposition: Hopefully can transition to the community hospice house  8. Hospice Plan of care was reviewed in detail and agree with current plan of care    Prognosis estimated based on 10/19/21 clinical assessment is:   [] Hours to Days    [x] Days to Weeks I think weeks is unlikely  [] Other:    Communicated plan of care with: Hospice Case Manager; Hospice IDT; Care Team     GOALS OF CARE     Patient/Medical POA stated Goal of Care: Hospice care    [x] I have reviewed and/or updated ACP information in the Advance Care Planning Navigator. This information is available in the [facility] [address] link in the patient's chart header.     Primary Decision Maker ([phone]):   Primary Decision Maker: Tonny Mcdaniel - [phone]    Resuscitation Status: DNR  If DNR is there a Durable DNR on file? : [x] Yes [] No (If no, complete Durable DNR)    HISTORY     History obtained from: Chart, patient, hospice liaison    CHIEF COMPLAINT: Right breast wound  The patient is:   [x] Verbal  [] Nonverbal  [] Unresponsive    HPI/SUBJECTIVE: Patient is alert and oriented. She is able to answer all questions. She admits to having some pain to the right chest wall although she did just received morphine. She has been eating very little and admits to significant weight loss. Wound has been extending significantly but currently covered       REVIEW OF SYSTEMS     The following systems were: [x] reviewed  [] unable to be reviewed    Positive ROS include:  Constitutional: fatigue, weakness, in pain, short of breath  Ears/nose/mouth/throat: increased airway secretions  Respiratory:shortness of breath, wheezing  Gastrointestinal:poor appetite, nausea, vomiting, abdominal pain, constipation, diarrhea  Musculoskeletal:pain, deformities, swelling legs  Neurologic:confusion, hallucinations, weakness  Psychiatric:anxiety, feeling depressed, poor sleep  Endocrine:     Adult Non-Verbal Pain Assessment Score:    She can verbalize her pain as 5-7 out of 10 at times.     Face  [] 0   No particular expression or smile  [] 1   Occasional grimace, tearing, frowning, wrinkled forehead  [] 2   Frequent grimace, tearing, frowning, wrinkled forehead    Activity (movement)  [] 0   Lying quietly, normal position  [] 1   Seeking attention through movement or slow, cautious movement  [] 2   Restless, excessive activity and/or withdrawal reflexes    Guarding  [] 0   Lying quietly, no positioning of hands over areas of body  [] 1   Splinting areas of the body, tense  [] 2   Rigid, stiff    Physiology (vital signs)  [] 0   Stable vital signs  [] 1   Change in any of the following: SBP > 20mm Hg; HR > 20/minute  [] 2   Change in any of the following: SBP > 30mm Hg; HR > 25/minute    Respiratory  [] 0   Baseline RR/SpO2, compliant with ventilator  [] 1   RR > 10 above baseline, or 5% drop SpO2, mild asynchrony with ventilator  [] 2   RR > 20 above baseline, or 10% drop SpO2, asynchrony with ventilator     FUNCTIONAL ASSESSMENT     Palliative Performance Scale (PPS): 20-30       PSYCHOSOCIAL/SPIRITUAL ASSESSMENT     Active Problems:    Breast mass (10/18/2021)      No past medical history on file. Past Surgical History:   Procedure Laterality Date    HX CATARACT REMOVAL        Social History     Tobacco Use    Smoking status: Never Smoker    Smokeless tobacco: Never Used   Substance Use Topics    Alcohol use: Not on file     No family history on file.    No Known Allergies   Current Facility-Administered Medications   Medication Dose Route Frequency    LORazepam (ATIVAN) injection 1 mg  1 mg IntraVENous Q15MIN PRN    glycopyrrolate (ROBINUL) injection 0.2 mg  0.2 mg IntraVENous Q4H PRN    bisacodyL (DULCOLAX) suppository 10 mg  10 mg Rectal DAILY PRN    morphine injection 2 mg  2 mg IntraVENous Q15MIN PRN    acetaminophen (TYLENOL) tablet 650 mg  650 mg Oral Q4H PRN    Or    acetaminophen (TYLENOL) solution 650 mg  650 mg Oral Q4H PRN    Or    acetaminophen (TYLENOL) suppository 650 mg  650 mg Rectal Q4H PRN    morphine injection 2 mg  2 mg IntraVENous Q4H    metroNIDAZOLE (METROGEL) 0.75 % gel   Topical BID        PHYSICAL EXAM     Wt Readings from Last 3 Encounters:   10/19/21 48 kg (105 lb 13.1 oz)       Visit Vitals  Pulse 92   Temp 98.2 °F (36.8 °C)   Resp 18   SpO2 99%       Supplemental O2  [] Yes  [x] NO  Last bowel movement:     Currently this patient has:  [x] Peripheral IV [] PICC  [] PORT [] ICD    [] Hayden Catheter [] NG Tube   [] PEG Tube    [] Rectal Tube [] Drain  [] Other:     Constitutional: Thin, pale, alert and oriented  Eyes: Reactive  ENMT: Slightly dry  Cardiovascular: Regular rate and rhythm  Respiratory: Clear to auscultation  Gastrointestinal: Soft, thin  Musculoskeletal: Profound muscle wasting  Skin: Her large right breast wound is covered and this does appear to extend into the sternal area  Neurologic: Nonfocal  Psychiatric: Calm  Other: Pertinent Lab and or Imaging Tests:  Lab Results   Component Value Date/Time    Sodium 135 (L) 10/19/2021 06:35 AM    Potassium 4.1 10/19/2021 06:35 AM    Chloride 112 (H) 10/19/2021 06:35 AM    CO2 10 (LL) 10/19/2021 06:35 AM    Anion gap 13 10/19/2021 06:35 AM    Glucose 53 (LL) 10/19/2021 06:35 AM    BUN 46 (H) 10/19/2021 06:35 AM    Creatinine 0.75 10/19/2021 06:35 AM    BUN/Creatinine ratio 61 (H) 10/19/2021 06:35 AM    GFR est AA >60 10/19/2021 06:35 AM    GFR est non-AA >60 10/19/2021 06:35 AM    Calcium 7.8 (L) 10/19/2021 06:35 AM     Lab Results   Component Value Date/Time    Protein, total 3.6 (L) 10/19/2021 06:35 AM    Albumin 1.4 (L) 10/19/2021 06:35 AM           Total time:   Counseling / coordination time:   > 50% counseling / coordination?:

## 2021-10-19 NOTE — PROGRESS NOTES
[facility]    ICU TEAM Consult Note    Name: Madisyn Cardoso   : 1960   MRN: [id number]   Date: 10/19/2021      Assessment:     ICU Problems:    1. Breast mass  2. Severe Sepsis  3. Hyponatremia  4. ANATOLIY      Imaging:  CT Results  (Last 48 hours)               10/18/21 1644  CT CHEST W CONT Final result    Impression:      1. Large, ulcerated right breast/chest wall mass with associated intercostal   involvement and probable pleural involvement as discussed in detail above. Associated right axillary adenopathy as well as vascular collateralization. 2. Abnormal appearance of the right first and third ribs, and small sclerotic   lesion in the upper manubrium. 3. Ill-defined groundglass nodularity right upper and lower lobes as well as   nodular pleural and major fissure thickening which could be   infectious/inflammatory or neoplastic. Minimal groundglass opacities left lung. 4. Subcentimeter right hepatic hypodensity too small to characterize,   indeterminate. Narrative:  INDICATION: chest wall mass       EXAM:  CT CHEST, ABDOMEN, PELVIS WITH CONTRAST       COMPARISON: Chest radiograph earlier today       TECHNIQUE:  CT imaging of the chest, abdomen and pelvis was performed after the   uneventful intravenous administration of contrast material.  Coronal and   sagittal reconstructions were generated. CT dose reduction was achieved through   use of a standardized protocol tailored for this examination and automatic   exposure control for dose modulation. FINDINGS:       THYROID: Unremarkable. MEDIASTINUM/SAM: Subcentimeter precarinal and right hilar lymph nodes. HEART/PERICARDIUM: Unremarkable. LUNGS/PLEURA: Vague and slightly nodular groundglass opacities in the posterior   right upper lobe and superior right lower lobe, with nodular thickening of the   right major fissure. Minimal groundglass opacity lateral and posterior left   upper lobe. No pleural effusion.  No pneumothorax pleural-based 8mm nodular   density lateral right upper lobe. BONES: Large, ulcerated and hypervascular mass occupying majority of the right   breast and axilla and extending inferiorly in the lateral chest wall, measuring   in total 18 x 5 x 15 cm. Associated right axillary masses/lymph nodes measuring   between 13 and 15 mm. Mass extends into the intercostal space between the second   and third, third and fourth, fourth and fifth, fifth and sixth and possibly   sixth and seventh ribs. Difficult to exclude pleural involvement particularly in   the anterior right upper lung between the second and fourth ribs. Irregular   lucency and sclerosis involving the anterior right first rib as well as anterior   third rib. Small sclerotic focus in the superior aspect of the manubrium   extensive vascular collateralization in the right chest wall and axilla. LIVER: 7 mm round hypodensity segment 6 of the liver is too small to   characterize. GALLBLADDER: Unremarkable. SPLEEN: No enlargement or lesion. PANCREAS: No mass or ductal dilatation. ADRENALS: No mass. KIDNEYS: No mass, calculus, or hydronephrosis. GI TRACT: No evidence of bowel obstruction. Sigmoid diverticulosis without   diverticulitis. Nonobstructing intussusception involving jejunal loops in the   left midabdomen   PERITONEUM: No free air or free fluid. APPENDIX: Unremarkable. RETROPERITONEUM: No aortic aneurysm. LYMPH NODES: None enlarged. ADDITIONAL COMMENTS: N/A.       URINARY BLADDER: No mass or calculus. LYMPH NODES: None enlarged. REPRODUCTIVE ORGANS: Uterus is present. FREE FLUID: Small amount. BONES: No destructive bone lesion. ADDITIONAL COMMENTS: N/A.           10/18/21 1644  CT ABD PELV W CONT Final result    Impression:      1. Large, ulcerated right breast/chest wall mass with associated intercostal   involvement and probable pleural involvement as discussed in detail above.    Associated right axillary adenopathy as well as vascular collateralization. 2. Abnormal appearance of the right first and third ribs, and small sclerotic   lesion in the upper manubrium. 3. Ill-defined groundglass nodularity right upper and lower lobes as well as   nodular pleural and major fissure thickening which could be   infectious/inflammatory or neoplastic. Minimal groundglass opacities left lung. 4. Subcentimeter right hepatic hypodensity too small to characterize,   indeterminate. Narrative:  INDICATION: chest wall mass       EXAM:  CT CHEST, ABDOMEN, PELVIS WITH CONTRAST       COMPARISON: Chest radiograph earlier today       TECHNIQUE:  CT imaging of the chest, abdomen and pelvis was performed after the   uneventful intravenous administration of contrast material.  Coronal and   sagittal reconstructions were generated. CT dose reduction was achieved through   use of a standardized protocol tailored for this examination and automatic   exposure control for dose modulation. FINDINGS:       THYROID: Unremarkable. MEDIASTINUM/SAM: Subcentimeter precarinal and right hilar lymph nodes. HEART/PERICARDIUM: Unremarkable. LUNGS/PLEURA: Vague and slightly nodular groundglass opacities in the posterior   right upper lobe and superior right lower lobe, with nodular thickening of the   right major fissure. Minimal groundglass opacity lateral and posterior left   upper lobe. No pleural effusion. No pneumothorax pleural-based 8mm nodular   density lateral right upper lobe. BONES: Large, ulcerated and hypervascular mass occupying majority of the right   breast and axilla and extending inferiorly in the lateral chest wall, measuring   in total 18 x 5 x 15 cm. Associated right axillary masses/lymph nodes measuring   between 13 and 15 mm. Mass extends into the intercostal space between the second   and third, third and fourth, fourth and fifth, fifth and sixth and possibly   sixth and seventh ribs.  Difficult to exclude pleural involvement particularly in   the anterior right upper lung between the second and fourth ribs. Irregular   lucency and sclerosis involving the anterior right first rib as well as anterior   third rib. Small sclerotic focus in the superior aspect of the manubrium   extensive vascular collateralization in the right chest wall and axilla. LIVER: 7 mm round hypodensity segment 6 of the liver is too small to   characterize. GALLBLADDER: Unremarkable. SPLEEN: No enlargement or lesion. PANCREAS: No mass or ductal dilatation. ADRENALS: No mass. KIDNEYS: No mass, calculus, or hydronephrosis. GI TRACT: No evidence of bowel obstruction. Sigmoid diverticulosis without   diverticulitis. Nonobstructing intussusception involving jejunal loops in the   left midabdomen   PERITONEUM: No free air or free fluid. APPENDIX: Unremarkable. RETROPERITONEUM: No aortic aneurysm. LYMPH NODES: None enlarged. ADDITIONAL COMMENTS: N/A.       URINARY BLADDER: No mass or calculus. LYMPH NODES: None enlarged. REPRODUCTIVE ORGANS: Uterus is present. FREE FLUID: Small amount. BONES: No destructive bone lesion. ADDITIONAL COMMENTS: N/A. ICU Comprehensive Plan of Care:     Plans for this Shift:     1. Patient has asked to stop all preventative treatment and only have measures that increase her quality of life. She no longer wishes to have pressors, antibiotics or any other treatment to prolong life. 2. Blood cultures pending  3. Breast surgery consulted  4. Punch biopsy will not change course and will likely be cancelled. .   5. COVID Treatment:  a. No symptoms, Not vaccinated, does not wish to be vaccinated  6. SBP Goal of: > 90 mmHg  7. MAP Goal of: > 65 mmHg  8. None - For above SBP/MAP goals  9. IVFs: NS  10. Transfusion Trigger (Hgb): <7 g/dL  11. Respiratory Goals:  a. Incentive spirometry  12. Pulmonary toilet: Incentive Spirometry   13. SpO2 Goal: > 92%  14.  Keep K>4; Mg>2 15. PT/OT: Will consult if needed   16. Goals of Care Discussion with family Yes   16. Plan of Care/Code Status: Do Not Resuscitate  18. Appreciate Consultants Input   19. Discussed Care Plan with Bedside RN  20. Documentation of Current Medications  21. Rest of Plan Below:    F - Feeding:  Yes   A - Analgesia: Acetaminophen  S - Sedation: None  T - DVT Prophylaxis: SCD's or Sequential Compression Device   H - Head of Bed: > 30 Degrees  U - Ulcer Prophylaxis: Not at this time   G - Glycemic Control: Insulin  S - Spontaneous Breathing Trial: N/A  B - Bowel Regimen: None needed at this time  I - Indwelling Catheter:   Tubes: None  Lines: Peripheral IV  Drains: None  D - De-escalation of Antibiotics: Vancomycin  Zosyn    Subjective:   Progress Note: 10/19/2021      Reason for ICU Admission: Severe Sepsis     HPI: This is a 57year old female who has not seen a health care provider for over 20 years. 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 She states about 3-4 years ago she started to \"gently de-worm\" with herbal remedies as she states that parasites in the intestines cause most diseases. About 4 months after starting this herbal regimen, she noticed a lump in her breast. It was at this time her \"naturopathic doctor\" recommended she be more aggressive with her de-worming. She states that \"apparently it didn't work because they have now gotten into my lymph nodes and I believe that it was is causing this, they got into the lymph node and cause it to bust\" she states she has been keeping the area clean and taking stronger de-wormer to clear her lymph nodes. She states she was also taking garlic but her stomach could not tolerate it, and her room mate was spraying febreze in the house, which she believes caused her condition to worsen.  She states \"the 80 chemicals in febreze have never been tested on humans, and I believe this has poisoned me\". Her room mate called EMS today to have her open area on her right breat looked at. The patient is agreeable to antibiotics, but would like to use \"natural alternatives\" if they are available. She would like to supplement her regimen with garlic. Today (10/19/2021) patient has decided she no longer wants to continue with treatment and would like to be placed on comfort care measures and go on hospice. POD:  * No surgery found *    S/P:       Active Problem List:     Problem List  Never Reviewed        Codes Class    Breast mass ICD-10-CM: N63.0  ICD-9-CM: 611.72               Past Medical History:      has no past medical history on file. Past Surgical History:      has a past surgical history that includes hx cataract removal.    Home Medications:     Prior to Admission medications    Not on File       Allergies/Social/Family History:     No Known Allergies   Social History     Tobacco Use    Smoking status: Never Smoker    Smokeless tobacco: Never Used   Substance Use Topics    Alcohol use: Not on file      History reviewed. No pertinent family history. Review of Systems:     Pertinent items are noted in HPI.     Objective:   Vital Signs:  Visit Vitals  BP (!) 74/42   Pulse 90   Temp 97.9 °F (36.6 °C)   Resp 16   Ht 5' 6\" (1.676 m)   Wt 48 kg (105 lb 13.1 oz)   SpO2 100%   BMI 17.08 kg/m²      O2 Device: None (Room air) Temp (24hrs), Av.3 °F (36.3 °C), Min:96.9 °F (36.1 °C), Max:97.9 °F (36.6 °C)           Intake/Output:     Intake/Output Summary (Last 24 hours) at 10/19/2021 0836  Last data filed at 10/19/2021 0700  Gross per 24 hour   Intake 4614.25 ml   Output 425 ml   Net 4189.25 ml       Physical Exam:  General appearance: alert, cooperative, no distress, appears stated age, ill-appearing   Lungs: clear to auscultation bilaterally  Breasts: Large right breast wound- please see picture in chart  Heart: regular rate and rhythm, S1, S2 normal, no murmur, click, rub or gallop  Abdomen: soft, non-tender. Bowel sounds normal. No masses,  no organomegaly  Extremities: extremities normal, atraumatic, no cyanosis or edema  Skin: Skin color, texture, turgor normal. No rashes or lesions  Neurologic: Grossly normal  Cap refill: Brisk, less than 3 seconds  Pulses: 2+, symmetric in all extremities                                                          LABS AND  DATA: Personally reviewed  Recent Labs     10/19/21  0635 10/18/21  1400   WBC 42.8* 73.6*   HGB 8.6* 11.5   HCT 26.0* 34.5*   * 627*     Recent Labs     10/18/21  1400   *   K 5.2*      CO2 18*   BUN 64*   CREA 1.06*   *   CA 8.6     Recent Labs     10/18/21  1400   *   TP 4.3*   ALB 1.0*   GLOB 3.3     No results for input(s): INR, PTP, APTT, INREXT, INREXT in the last 72 hours. No results for input(s): PHI, PCO2I, PO2I, FIO2I in the last 72 hours. No results for input(s): CPK, CKMB, TROIQ, BNPP in the last 72 hours. Hemodynamics:   PAP:   CO:     Wedge:   CI:     CVP:    SVR:       PVR:       Ventilator Settings:  Mode Rate Tidal Volume Pressure FiO2 PEEP                    Peak airway pressure:      Minute ventilation:          MEDS: Reviewed    Chest X-Ray:  CXR Results  (Last 48 hours)               10/18/21 1502  XR CHEST PORT Final result    Impression:  No acute cardiopulmonary disease radiographically. . Large right chest wall   mass. .           Narrative:  INDICATION: Right breast/chest wall mass, hypotension. Patient has had mass for   3 years, with homeopathic/holistic treatment. EXAM: Chest single view. COMPARISON: None. Clinton Garza FINDINGS: A single frontal view of the chest at 1455 hours shows clear lungs. Increased density projecting over the right lung base is thought to be related   to the right breast/chest wall mass. The heart, mediastinum and pulmonary   vasculature are normal. The left lung is clear. The bony thorax is unremarkable   for age. Clinton Garza ECHO:      Multidisciplinary Rounds Completed:  Pending    ABCDEF Bundle/Checklist Completed:  Yes    SPECIAL EQUIPMENT  None    DISPOSITION  Transfer to non-ICU bed    CRITICAL CARE CONSULTANT NOTE  I had a face to face encounter with the patient, reviewed and interpreted patient data including clinical events, labs, images, vital signs, I/O's, and examined patient. I have discussed the case and the plan and management of the patient's care with the consulting services, the bedside nurses and the respiratory therapist.      NOTE OF PERSONAL INVOLVEMENT IN CARE   This patient has a high probability of imminent, clinically significant deterioration, which requires the highest level of preparedness to intervene urgently. I participated in the decision-making and personally managed or directed the management of the following life and organ supporting interventions that required my frequent assessment to treat or prevent imminent deterioration. I personally spent 30 minutes of critical care time. This is time spent at this critically ill patient's bedside actively involved in patient care as well as the coordination of care and discussions with the patient's family. This does not include any procedural time which has been billed separately.       Eliana Stephen [facility]     Critical Care Medicine  [facility]

## 2021-10-19 NOTE — PROGRESS NOTES
Reviewed chart for transitions of care, and discussed in rounds. CM met with patient at bedside to explain role and offer support. Patient is alert and oriented x4, and confirmed demographics. Baseline:   ADLs/IDALS:Independent  Previous Home Health:None  Previous SNF/IPR:None  ER Contact: Ruben Camara [phone]    Patient lives in a 2  level house with 19 steps to enter. Patient is independent with ADLs/IDALs   Patient has no previous HH or equipment needs. . Patient does not take any medications. Transportation TBD  Reason for Admission: Breast Mass                     RUR Score:     12%                Plan for utilizing home health:   NA       PCP: First and Last name:  Other, Nahomy, MD     Name of Practice:    Are you a current patient: Yes/No:    Approximate date of last visit:    Can you participate in a virtual visit with your PCP:                     Current Advanced Directive/Advance Care Plan: DNR      Healthcare Decision Maker:   Click here to complete [address] including selection of the Healthcare Decision Maker Relationship (ie \"Primary\")           Patient presented to the ED from home with a large wound on her right breast. Per notes patient has not seen a MD for years and has been treating the wound with deworming medications. Per notes patient has decided to make herself an DNR. CM met with patient to introduce self and explain role. Patient lives independently in a 2 story home with a room mate. She works at [employer] in [place]. Patient has not seen a MD in many years and does not take any type of medication. Hospice has been consulted. Patient confirmed she does not have medical insurance. Care management will continue to follow.    Jerri Walter RN,Care Management  Care Management Interventions  Transition of Care Consult (CM Consult):  [facility])

## 2021-10-19 NOTE — ED NOTES
TRANSFER - OUT REPORT:    Verbal report given to Levy RN(name) on Drew Aguilar  being transferred to ICU(unit) for routine progression of care       Report consisted of patients Situation, Background, Assessment and   Recommendations(SBAR). Information from the following report(s) SBAR was reviewed with the receiving nurse. Lines:   Peripheral IV 10/18/21 Left Antecubital (Active)   Site Assessment Clean, dry, & intact 10/18/21 1424   Phlebitis Assessment 0 10/18/21 1424   Infiltration Assessment 0 10/18/21 1424   Dressing Status Clean, dry, & intact 10/18/21 1424   Hub Color/Line Status Pink;Capped;Flushed 10/18/21 1424   Action Taken Blood drawn 10/18/21 1424       Peripheral IV 10/18/21 Right; Outer Arm (Active)   Site Assessment Clean, dry, & intact 10/18/21 1424   Phlebitis Assessment 0 10/18/21 1424   Infiltration Assessment 0 10/18/21 1424   Dressing Status Clean, dry, & intact 10/18/21 1424   Hub Color/Line Status Blue;Capped 10/18/21 1424   Action Taken Blood drawn 10/18/21 1424        Opportunity for questions and clarification was provided.       Patient transported with:   Registered Nurse

## 2021-10-19 NOTE — PROGRESS NOTES
[facility] [address], [id number] [place] [address] Road, [facility]: [phone]  F: [phone]    Reason for Visit:   Bridger Priest is a 57 y.o. female who is seen in follow up for breast mass    Treatment History:   No tissue dx yet    STAGE: pending    History of Present Illness:     Pt seen today in ER for large fungating RIGHT breast mass present for years. Pt does not sees doctors and states she was taking de worming medicine for breast mass. No tissue dx of cancer. Pt is cachetic in bed. States she know mass may be cancer but wants to know origin of cancer. Pt denies pain. Nursing at bedside. CTs pending. Wbc 70 k. No fevers/ chills/ chest pain/ SOB/ nausea/ vomiting/diarrhea/ pain/    Interval History: Ms. Saige Elizondo was admitted to the ICU due to hypotension. She has met with Critical Care team, does not wish to have any pressor support/central line/IV antibiotics. She has been transitioned to comfort measures, DNR status. No planned work up for breast mass. History reviewed. No pertinent past medical history. Past Surgical History:   Procedure Laterality Date    HX CATARACT REMOVAL        Social History     Tobacco Use    Smoking status: Never Smoker    Smokeless tobacco: Never Used   Substance Use Topics    Alcohol use: Not on file      History reviewed. No pertinent family history. Current Facility-Administered Medications   Medication Dose Route Frequency    peppermint spirit solution   Other PRN    morphine injection 2 mg  2 mg IntraVENous Q1H PRN    LORazepam (ATIVAN) injection 1 mg  1 mg IntraVENous Q1H PRN    sodium chloride (NS) flush 5-40 mL  5-40 mL IntraVENous Q8H    sodium chloride (NS) flush 5-40 mL  5-40 mL IntraVENous PRN      No Known Allergies     Review of Systems: A complete review of systems was obtained, negative except as described above.     Physical Exam:     Visit Vitals  BP (!) 75/42   Pulse 96   Temp 96.9 °F (36.1 °C)   Resp 19   Ht 5' 6\" (1.676 m)   Wt 105 lb 13.1 oz (48 kg)   SpO2 100%   BMI 17.08 kg/m²     ECOG PS: 3  General: no acute distress  Respiratory: normal respiratory effort  Psych:  Awake, conversant    Results:     Lab Results   Component Value Date/Time    WBC 42.8 (H) 10/19/2021 06:35 AM    HGB 8.6 (L) 10/19/2021 06:35 AM    HCT 26.0 (L) 10/19/2021 06:35 AM    PLATELET 016 (H) 83/46/0359 06:35 AM    MCV 78.1 (L) 10/19/2021 06:35 AM    ABS. NEUTROPHILS 39.8 (H) 10/19/2021 06:35 AM     Lab Results   Component Value Date/Time    Sodium 135 (L) 10/19/2021 06:35 AM    Potassium 4.1 10/19/2021 06:35 AM    Chloride 112 (H) 10/19/2021 06:35 AM    CO2 10 (LL) 10/19/2021 06:35 AM    Glucose 53 (LL) 10/19/2021 06:35 AM    BUN 46 (H) 10/19/2021 06:35 AM    Creatinine 0.75 10/19/2021 06:35 AM    GFR est AA >60 10/19/2021 06:35 AM    GFR est non-AA >60 10/19/2021 06:35 AM    Calcium 7.8 (L) 10/19/2021 06:35 AM    Glucose (POC) 96 10/19/2021 10:59 AM     Lab Results   Component Value Date/Time    Bilirubin, total 0.5 10/19/2021 06:35 AM    ALT (SGPT) 25 10/19/2021 06:35 AM    Alk. phosphatase 213 (H) 10/19/2021 06:35 AM    Protein, total 3.6 (L) 10/19/2021 06:35 AM    Albumin 1.4 (L) 10/19/2021 06:35 AM    Globulin 2.2 10/19/2021 06:35 AM     CT CAP 10/18/21    IMPRESSION     1. Large, ulcerated right breast/chest wall mass with associated intercostal  involvement and probable pleural involvement as discussed in detail above. Associated right axillary adenopathy as well as vascular collateralization. 2. Abnormal appearance of the right first and third ribs, and small sclerotic  lesion in the upper manubrium. 3. Ill-defined groundglass nodularity right upper and lower lobes as well as  nodular pleural and major fissure thickening which could be  infectious/inflammatory or neoplastic. Minimal groundglass opacities left lung. 4. Subcentimeter right hepatic hypodensity too small to characterize,  indeterminate.     Records reviewed and summarized above. No pertinent pathology at this time. Radiology report(s) reviewed above. Assessment/PLAN:     1)  RIGHT fungating breast mass  Presumed advanced cancer, no pathologic diagnosis   CT CAP 10/18/21 with probable pleural involvement, suspicious lesions on right 1st/3rd ribs, ?heptic lesion  Patient has decided against any further work up or life prolonging measures and is now comfort care in the ICU  DNR      This patient was seen in conjunction with A FRANK Carver. I personally performed a face to face diagnostic evaluation on this patient. I personally reviewed the history and performed the key points on the exam.   I personally reviewed all points in the assessment and created treatment plan with the patient. Specifically, pt seen by me today  Pt is in bed in ICU. Discussed presumed cancer dx and need for biopsy and possible treatment. Pt does not want to pursue dx or any treatment for her presumed breast cancer. Pt prefers hospice care. Case d/w ICU team. They had same conversation with pt and pt is consistent in her decision for hospice. Pt comfortable at my visit. D/w breast surgery and cancelled biopsy. We will follow as needed. We will sign off at this time. I appreciate the opportunity to participate in [facility] care.     Signed By: Remigio Ricks DO

## 2021-10-19 NOTE — PROGRESS NOTES
740-Bedside and Verbal shift change report [facility] by Anna Hickman (offgoing nurse). Report included the following information SBAR, Kardex, ED Summary, Procedure Summary, Intake/Output, MAR, Accordion, Recent Results, Med Rec Status, Cardiac Rhythm SR, Alarm Parameters , Quality Measures and Dual Neuro Assessment. 1030- Called hospice consult.

## 2021-10-19 NOTE — DISCHARGE SUMMARY
Discharge Summary PATIENT ID: Madisyn Cardoso MRN: [id number] YOB: 1960 DATE OF ADMISSION: 10/18/2021  1:42 PM   
DATE OF DISCHARGE: 10/19/2021 PRIMARY CARE PROVIDER: Nahomy Krishnan MD  
 
ATTENDING PHYSICIAN: Jaqueline Herrera MD 
DISCHARGING PROVIDER: Elisabeth Aldridge NP To contact this individual call [phone] and ask the  to page. If unavailable ask to be transferred the Adult Hospitalist Department. CONSULTATIONS: IP CONSULT TO GENERAL SURGERY 
IP CONSULT TO PALLIATIVE CARE - PROVIDER PROCEDURES/SURGERIES: * No surgery found * [address] COURSE:  
Per ICU note: HPI: This is a 57year old female who has not seen a health care provider for over 20 years. 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 She states about 3-4 years ago she started to \"gently de-worm\" with herbal remedies as she states that parasites in the intestines cause most diseases. About 4 months after starting this herbal regimen, she noticed a lump in her breast. It was at this time her \"naturopathic doctor\" recommended she be more aggressive with her de-worming. She states that \"apparently it didn't work because they have now gotten into my lymph nodes and I believe that it was is causing this, they got into the lymph node and cause it to bust\" she states she has been keeping the area clean and taking stronger de-wormer to clear her lymph nodes. She states she was also taking garlic but her stomach could not tolerate it, and her room mate was spraying febreze in the house, which she believes caused her condition to worsen. She states \"the 80 chemicals in febreze have never been tested on humans, and I believe this has poisoned me\". Her room mate called EMS today to have her open area on her right breat looked at.  The patient is agreeable to antibiotics, but would like to use \"natural alternatives\" if they are available. She would like to supplement her regimen with garlic. Today (10/19/2021) patient has decided she no longer wants to continue with treatment and would like to be placed on comfort care measures and go on hospice.  
  
  
Hospitalist note:  
10/19: Seen and examined patient sitting up in bed. States she is feeling ok. States that she does not want any treatment. She would like a only wants a better quality of life rather than quantity, asking for hospice services. Chart reviewed. Patient to transfer out of ICU today. Comfort measures only. I have offered to call her roommate or sister (who is local) or brother (who lives in [facility]) to provide them with an update on her condition and plan of care. She states that she has been keeping in contact with them and does not want me to call. 1530: Patient to be admitted into inpatient hospice services DISCHARGE DIAGNOSES / PLAN:   
 
 
Patient to be admitted into inpatient hospice services Breast Mass - Admit patient - . Large, ulcerated right breast/chest wall mass with associated intercostal 
involvement and probable pleural involvement as discussed in detail above. Associated right axillary adenopathy as well as vascular collateralization. 2. Abnormal appearance of the right first and third ribs, and small sclerotic 
lesion in the upper manubrium. 
- WBC 73-> 42.8 
- GS consulted - Breast surgery consulted  
- Oncology consulted- For punch biopsy in am 
- Patient requesting to be made comfort care only. Does not want to continue with any treatment.   
  
Severe sepsis - Likely secondary to above  
- as evidence by WBC 73.5, HR 99, SBP <90, C02 18 - BC pending Jeanette Juwan and Zosyn started- Patient requested to be stopped  
  
Hyponatremia  
-  
  
ANATOLIY  
- Creatinine , baseline unknown 
  
Hyperkalemia - potassium 5.2 ADDITIONAL CARE RECOMMENDATIONS:  
Admitted into inpatient hospice services PENDING TEST RESULTS:  
At the time of discharge the following test results are still pending: none FOLLOW UP APPOINTMENTS:   
Follow-up Information Follow up With Specialties Details Why Contact Info Other, MD Nahomy    Patient can only remember the practice name and not the physician DIET: Resume previous diet ACTIVITY: Activity as tolerated WOUND CARE: Per hospice EQUIPMENT needed: Per hospice DISCHARGE MEDICATIONS: 
There are no discharge medications for this patient. NOTIFY YOUR PHYSICIAN FOR ANY OF THE FOLLOWING:  
Fever over 101 degrees for 24 hours. Chest pain, shortness of breath, fever, chills, nausea, vomiting, diarrhea, change in mentation, falling, weakness, bleeding. Severe pain or pain not relieved by medications. Or, any other signs or symptoms that you may have questions about. DISPOSITION: 
  Home With: 
 OT  PT  HH  RN  
  
 Long term SNF/Inpatient Rehab Independent/assisted living X Hospice in patient Other:  
 
 
PATIENT CONDITION AT DISCHARGE:  
 
Functional status X Poor Deconditioned Independent Cognition X Lucid Forgetful Dementia Catheters/lines (plus indication) Hayden PICC   
 PEG   
X None Code status Full code X DNR   
 
PHYSICAL EXAMINATION AT DISCHARGE: 
General:          Alert, cooperative, no distress, appears stated age. HEENT:           Atraumatic, anicteric sclerae, pink conjunctivae No oral ulcers, mucosa moist, throat clear, dentition fair Neck:               Supple, symmetrical 
Lungs:             Clear to auscultation bilaterally. No Wheezing or Rhonchi. No rales. Chest wall:      No tenderness  No Accessory muscle use. Heart:              Regular  rhythm,  No  murmur   No edema Abdomen:        Soft, non-tender. Not distended. Bowel sounds normal 
Extremities:     No cyanosis.   No clubbing,   
                        Skin turgor normal, Capillary refill normal 
Skin:                Large right breast wound- copious drainage, foul smelling. See picture in chart Psych:             Not anxious or agitated. Neurologic:      Alert, moves all extremities, answers questions appropriately and responds to commands CHRONIC MEDICAL DIAGNOSES: 
Problem List as of 10/19/2021 Never Reviewed Codes Class Noted - Resolved Breast mass ICD-10-CM: N63.0 ICD-9-CM: 611.72  10/18/2021 - Present Greater than 45 minutes were spent with the patient on counseling and coordination of care Signed:  
Truman Stanley NP 
10/19/2021 
3:33 PM

## 2021-10-19 NOTE — PROGRESS NOTES
[facility] Adult  Hospitalist Group     ICU Transfer/Accept Summary     This patient is being [place] [address] ICU  DATE OF TRANSFER: 10/19/2021       PATIENT ID: Tiffani Manuel  MRN: [id number]   YOB: 1960    PRIMARY CARE PROVIDER: Elier Donald MD   DATE OF ADMISSION: 10/18/2021  1:42 PM    ATTENDING PHYSICIAN: Radha King NP  CONSULTATIONS:   IP CONSULT TO GENERAL SURGERY  IP CONSULT TO PALLIATIVE CARE - PROVIDER    PROCEDURES/SURGERIES:   * No surgery found *    REASON FOR ADMISSION: <principal problem not specified>     HOSPITAL PROBLEM LIST:  Patient Active Problem List   Diagnosis Code    Breast mass N63.0         Brief HPI and Hospital Course:    Per ICU note: HPI: This is a 57year old female who has not seen a health care provider for over 20 years. 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 She states about 3-4 years ago she started to \"gently de-worm\" with herbal remedies as she states that parasites in the intestines cause most diseases. About 4 months after starting this herbal regimen, she noticed a lump in her breast. It was at this time her \"naturopathic doctor\" recommended she be more aggressive with her de-worming. She states that \"apparently it didn't work because they have now gotten into my lymph nodes and I believe that it was is causing this, they got into the lymph node and cause it to bust\" she states she has been keeping the area clean and taking stronger de-wormer to clear her lymph nodes. She states she was also taking garlic but her stomach could not tolerate it, and her room mate was spraying febreze in the house, which she believes caused her condition to worsen. She states \"the 80 chemicals in febreze have never been tested on humans, and I believe this has poisoned me\". Her room mate called EMS today to have her open area on her right breat looked at. The patient is agreeable to antibiotics, but would like to use \"natural alternatives\" if they are available. She would like to supplement her regimen with garlic. Today (10/19/2021) patient has decided she no longer wants to continue with treatment and would like to be placed on comfort care measures and go on hospice. Hospitalist note:   10/19: Seen and examined patient sitting up in bed. States she is feeling ok. States that she does not want any treatment. She would like a only wants a better quality of life rather than quantity, asking for hospice services. Chart reviewed. Patient to transfer out of ICU today. Comfort measures only. I have offered to call her roommate or sister (who is local) or brother (who lives in [facility]) to provide them with an update on her condition and plan of care. She states that she has been keeping in contact with them and does not want me to call. Assessment and Plan:    Comfort measures only       Breast Mass   - Admit patient   - . Large, ulcerated right breast/chest wall mass with associated intercostal  involvement and probable pleural involvement as discussed in detail above. Associated right axillary adenopathy as well as vascular collateralization. 2. Abnormal appearance of the right first and third ribs, and small sclerotic  lesion in the upper manubrium.  - WBC 73-> 42.8  - GS consulted   - Breast surgery consulted   - Oncology consulted- For punch biopsy in am  - Patient requesting to be made comfort care only.  Does not want to continue with any treatment.       Severe sepsis   - Likely secondary to above   - as evidence by WBC 73.5, HR 99, SBP <90, C02 18  - BC pending   -  Hermon Deiters and Zosyn started- Patient requested to be stopped      Hyponatremia   -      ANATOLIY   - Creatinine , baseline unknown    Hyperkalemia   - potassium 5.2       PHYSICAL EXAMINATION:  Visit Vitals  BP (!) 71/40   Pulse 99   Temp 96.9 °F (36.1 °C)   Resp 15   Ht 5' 6\" (1.676 m) Wt 48 kg (105 lb 13.1 oz)   SpO2 92%   BMI 17.08 kg/m²       General:          Alert, cooperative, no distress  HEENT:           Atraumatic, MMM            Neck:               Supple, symmetrical,  thyroid: non tender  Lungs:             Clear to auscultation bilaterally. No Wheezing or Rhonchi. No rales. Heart:              Regular  rhythm,  No  murmur   No edema  Abdomen:       Soft, non-tender. Not distended. Bowel sounds normal  Extremities:     No cyanosis. No clubbing,  +2 distal pulses  Skin:                Large right breast wound- copious drainage, foul smelling. See picture in chart. Psych:             Not anxious or agitated.   Neurologic:      Alert, moves all extremities, oriented X 3.     CODE STATUS:   Full Code    DNR    Partial   X Comfort Care     Signed:   Cass Angulo NP  Date of Service:  10/19/2021  10:02 AM

## 2021-10-19 NOTE — HOSPICE
[facility]  [phone]     Patient Name: Rodolfo Jones  YOB: 1960  Age: 57 y.o. [facility] KAT RN Note:  Hospice consult received, reviewing chart. Will follow up with Unit Nurse and Care Manager to discuss plan of care, patient status and discharge disposition within the hour. Thank you for the opportunity to be of service to this patient.     Ant Mims RN  Clinical Nurse Liaison   [facility]  J)[phone] (q) [phone]

## 2021-10-19 NOTE — PROGRESS NOTES
0000: TRANSFER - IN REPORT:    Verbal report received from [address] (name) on Camille Keenan  being received from ED (unit) for routine progression of care      Report consisted of patients Situation, Background, Assessment and   Recommendations(SBAR). Information from the following report(s) SBAR, Kardex, Intake/Output, MAR, Recent Results, Cardiac Rhythm NSR and Alarm Parameters  was reviewed with the receiving nurse. Opportunity for questions and clarification was provided. Assessment completed upon patients arrival to unit and care assumed. 0009: Patient arrived on the unit. While assessing, patient asked if she was eligible for hospice. Discussed potential of needing a central line and patient expressed how she did not want that. 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 SBP in the 70's. Miguel. NP notified of conversation. Primary Nurse Melvi Hernandez RN and Mandi Covington RN performed a dual skin assessment on this patient Impairment noted- see wound doc flow sheet  Tay score is 15    0130: Notified FRANK Rivera of patients SBP in the upper 60's. 0730: Bedside and Verbal shift change report given to Brittany Gregg RN (oncoming nurse) by Ivan Ontiveros RN (offgoing nurse). Report included the following information SBAR, Kardex, Intake/Output, MAR, Recent Results, Cardiac Rhythm NSR with PVC's and Alarm Parameters .

## 2021-10-19 NOTE — PROGRESS NOTES
Brief Critical Care Note    Patient arrived to ICU around midnight. She was hypotensive with poor IV access. She voiced that she would not want a central line and does not want to suffer. She asked the nurses about hospice. Given these comments, I went to discuss goals of care with the patient. I explained to her that she was very hypotensive and that if she wanted to continue aggressive care that she would likely need vasopressors and a central line (given her poor IV access). She stated that she would not want either that she does not want to suffer or prolong \"the inevitable. \" We discussed options moving forward (she had already made herself a DNR/DNI). We discussed no escalation of care and comfort measures. She decided she would like to continue current treatment but with no escalation of care. She would like to discuss possible comfort/hospice with palliative care in the morning.     Ivy Simmons, DANAP

## 2021-10-19 NOTE — PROGRESS NOTES
Patient arrived to unit. Report received. She appears pleasant with no s/s of distress. Vital signs assessed. Room Orientation provided

## 2021-10-19 NOTE — HOSPICE
[facility]  [facility]  [phone]    Patient Name: Arron Lesches  YOB: 1960  Age: 57 y.o. [facility] KAT RN Note:  Hospice consult noted. Chart reviewed. Plan of care discussed with patients nurse & care manager. In to meet with patient. Discussed Hospice philosophy, general plan of care, levels of care, services and on call procedures. Family information packet provided & reviewed with patient. Patient very pleasant, alert and oriented. Describes increased weakness. When asked what she knows about her diagnosis. She told me she has probably let her breast go too long. She feels is is related to a parasite but states the doctors have told her it is cancer. She is accepting of this and wants to work up or aggressive treatment. She is currently on comfort measures. Hospice SW to meet with her to complete FAP paperwork. Once FAP approved we could look at moving her to [facility] for care if she is stable for transport. She is currently not meeting GIP criteria but this could change. We would look at taking her to [facility] under routine hospice to start. Thank you for the opportunity to be of service to this patient.     Chase Doan RN  Clinical Nurse Liaison   [facility]  [phone])[phone] (K) [phone]

## 2021-10-19 NOTE — HOSPICE
[facility]  [facility]  [phone]     Patient Name: Tiffani Manuel  YOB: 1960  Age: 57 y.o. [address] RN Note: Assessed by Dr Lena Carter. Patient will be admitted inpatient hospice at [facility] today. Thank you for the opportunity to be of service to this patient.     Opal De RN  Clinical Nurse Liaison   [address]  I)[phone] (W) [phone]

## 2021-10-20 NOTE — HOSPICE
[facility]  [phone]    GIP Daily Nursing Note   Patient Name: Dino Reid  YOB: 1960  Age: 57 y.o. Date of Visit: 10/20/21  Facility of Care: [address] [place] [address]  Patient Room: [place] [address] Attending: Louise Perez MD  Hospice Diagnosis: Breast mass [N63.0]    Level of Care: GIP    Current GIP Symptoms    1. Pain  2. Fungating breast and chest wall mass  3. Weakness  4. Anorexia        ASSESSMENT & PLAN       1. Morphine 2mg IV every 4 hours for pain  2. PRN comfort set for breakthrough symptoms  3. Wound care as ordered  4.  and SW available for ongoing support  5. Will transfer to [facility] today for routine level of care under [facility]    Spiritual Interventions: hospice and hospital chaplains available for emotional and spiritual support. Psych/ Social/ Emotional Interventions: Patient had a friend visit today    Care Coordination Needs: Communicate with floor nurse to ensure comfort and adjust medications as needed to ensure comfort. Care plan and New Orders discussed / approved with Barrington Sanchez MD.    Description History and Chart Review   If this is initial GIP note must document RN assessment/MD communication in previous setting. Specifically document nursing/medication needs in last 24 hours to support GIP care  Narrative History of last 24 hours that demonstrates care cannot be provided in another setting:   BP has stabilized. Will plan to transfer to [facility] this evening under routine level of care. What has been done to control the patient's symptoms in the last 24 hours? Morphine 2mg Iv every 4 hours scheduled for pain  Wound care to right breast and chest wall        Does the patient currently require IV medications? yes  Does the patient currently require scheduled medications? yes  Does the patient currently require a PCA?  no    List number of doses of PRN medications in last 24 hours:  Medication 1:  Number of doses:    Medication 2:   Number of doses:    Medication 3:   Number of doses:    Supporting documentation for GIP need for pain control:  [] Frequent evaluation by a doctor, nurse practitioner, nurse   [] Frequent medication adjustment    [] IVs that cannot be administered at home   [] Aggressive pain management   [] Complicated technical delivery of medications              Supporting documentation for GIP need for symptom control:  []  Sudden decline necessitating intensive nursing intervention  []  Uncontrolled / intractable nausea or vomiting   []  Pathological fractures  []  Advanced open wounds requiring frequent skilled care  [] Unmanageable respiratory distress  [] New or worsening delirium   [] Delirium with behavior issues: Is 24 hour caregiver present due to safety concerns with agitation? (yes/no)  [] Imminent death  with skilled nursing needs documented above    DISCHARGE PLANNING   Daily discharge planning required for GIP  1. Discharge Plan: transfer to [facility] today for routine hospice care. 2. Patient/Family teaching: routine hospice care at [facility]  3. Response to patient/family teaching: patient agrees with plan.      ASSESSMENT    KARNOFSKY: 30    Prognosis estimated based on 10/20/21 clinical assessment is:   [] Few to Many Hours  [] Hours to Days   [] Few to Many Days   [x] Days to Weeks   [] Few to Many Weeks   [] Weeks to Months   [] Few to Many Months    Quality Measure: Patient self-reports:  [x] Yes    [] No    ESAS:   Time of Assessment: 1000  Pain (1-10):3  Fatigue (1-10): 4  Shortness of breath (1-10):0  Nausea (1-10): 0  Appetite (1-10): 5  Anxiety: (1-10): 0  Depression: (1-10):   Well-being: (1-10):   Constipation: _ Yes  _ No  LAST BM:     CLINICAL INFORMATION   Patient Vitals for the past 12 hrs:   Temp Pulse Resp BP SpO2   10/20/21 0848 97.5 °F (36.4 °C) (!) 125 16 (!) 86/55 96 %   10/20/21 0250    (!) 94/56    10/20/21 0242 97.7 °F (36.5 °C) (!) 106 18  98 %       Currently this patient has:  [] Supplemental O2   [x] IV    [] PICC      [] PORT   [] NG Tube    [] PEG Tube   [] Ostomy     [] Hayden draining _______ urine  [] Other:     SIGNS/PHYSICAL FINDINGS     Skin (including wound):  [] Warm, dry, supple, intact and color normal for race  [x] Warm   [] Dry   [] Cool     [] Clammy       [] Diaphoretic    Turgor   [x] Normal   [] Decreased  Color:   [] Pink   [] Pale   [] Cyanotic   [] Erythema   [] Jaundice   [x] Normal for Race  [x]  Wounds:right breast and chest wall  Neuro:  [x] Lethargy  [] Restlessness / agitation  [] Confusion / delirium  [] Hallucinations  [] Responds to maximal stimulation  [] Unresponsive  [] Seizures     Cardiac:  [] Dyspnea on Exertion  [] JVD  [] Murmur  [] Palpitations  [x] Hypotension  [] Hypertension  [] Tachycardia  [] Bradycardia  [] Irregular HR  [] Pulses Decreased  [] Pulses Absent  [] Edema:       (Location, Grade and Pitting)  [] Mottling:      (Location)    Respiratory:  Breath sounds:    [] Diminished   [] Wheeze   [] Rhonchi   [] Rales   [x] Even and unlabored  [] Labored:            [] Cough   [] Non Productive   [] Productive    [] Description:           [] Deep suctioned   [] O2 at ___ LPM  [] High flow oxygen greater than 10 LPM  [] Bi-Pap    GI  [x] Abdomen: soft, non-distended  [] Ascites  [] Nausea  [] Vomiting  [] Incontinent of bowels  [x] Bowel sounds (yes)  [] Diarrhea  [] Constipation (see above including last bowel movement)  [] Checked for impaction  [x] Last BM 10/19/21    Nutrition  Diet:regular  Appetite:   [] Good   [x] Fair   [] Poor   [] Tube Feeding       [x] Voiding  [] Incontinent   [] Hayden    Musculoskeletal  [] Balance/[place] Unsteady   [x] Weak   Strength:    [] Normal    [] Limited    [x] Decreasing   Activities:    [] Up as tolerated   [x] Bedridden    [] Specify  SAFETY  [] 24 hr. Caregiver   [x] Side rails ?     [x] Hospital bed   [] Reviewed Falls & Safety       ALLERGIES AND MEDICATIONS     Allergies: No Known Allergies    Current Facility-Administered Medications   Medication Dose Route Frequency    LORazepam (ATIVAN) injection 1 mg  1 mg IntraVENous Q15MIN PRN    glycopyrrolate (ROBINUL) injection 0.2 mg  0.2 mg IntraVENous Q4H PRN    bisacodyL (DULCOLAX) suppository 10 mg  10 mg Rectal DAILY PRN    morphine injection 2 mg  2 mg IntraVENous Q15MIN PRN    acetaminophen (TYLENOL) tablet 650 mg  650 mg Oral Q4H PRN    Or    acetaminophen (TYLENOL) solution 650 mg  650 mg Oral Q4H PRN    Or    acetaminophen (TYLENOL) suppository 650 mg  650 mg Rectal Q4H PRN    morphine injection 2 mg  2 mg IntraVENous Q4H    metroNIDAZOLE (METROGEL) 0.75 % gel   Topical BID          Visit Time In: 0945  Visit Time Out: 1000

## 2021-10-20 NOTE — PROGRESS NOTES
LMSW met with patient bedside along with RN Liaison Elke Nunez. Patient was awake and alert. Patient stated that she was able to sleep last night for the first time in many days. Patient expressed how happy she was to have gotten a good night's sleep. LMSW provided validation. Patient stated that she is going to have visitors soon and is looking forward to that. Patient expressed appreciation for hospice team. Stephon [place] will continue to be available to address needs that arise.      PEDRO Henson, [address] [facility]   [phone]

## 2021-10-20 NOTE — HOSPICE
[facility] in Need  [phone]    Social Work Admission Note  Patient Name: Rodolfo Jones  YOB: 1960  Age: 57 y.o. Date of Visit: 10/19/21  Facility of Care: [facility]  Patient Room: [place] [address] Attending: Adama Gray MD  Hospice Diagnosis: Breast mass [N63.0]    Level of Care:    [x]  GIP    []  Respite   []  Routine    NARRATIVE     MSW met with patient for initial visit Patient alert and oriented x 4 and was able to complete consent paperwork with MSW. Patient shared that she is accepting of hospice and wants to have quality of life over quantity at this time. Patient expressed some sadness at her diagnosis and prognosis, noting that she has always been so healthy and doesn't see how this has happened. However, patient has a positive view and just wants to focus on quality of life. MSW discussed DNR with patient, patient agreeable to this and singed one on this visit. Patient shared that she did not have any more questions for MSW at this time, shared she would be open to completing a Medicaid application as she does not have insurance and is not old enough for Medicare yet. Patient would like support from MSW another time to complete application as she was tired now. MSW reassured patient that team will assist her in completing application when she is ready. MSW confirmed that patient's bank statement was received to complete paperwork needed for FAP application. MSW also started conversation with patient about final arrangements. Patient shared that she thinks she wants to be cremated, but has not finalized plans. MSW shared that patient can make those arrangements ahead of time or she should talk to her family to make sure that they are aware of her wishes. Patient shared that she would talk to her brother about arrangements.  MSW shared that patient can have him call hospice if there are any questions or concerns or if he needs any assistance in getting information. ADVANCE CARE PLANNING    Code Status: DNR  Durable DNR: _X_ Yes  _ No  No flowsheet data found. Relationship Status:  [x]  Single     []        []      []  Domestic Partner     []  /  []  Common Law  []    []  Unknown    If in a relationship, name of partner/spouse:  Duration of relationship:    Yazidi: No Buddhism on file     Home: undecided  Resources Provided: completed FAP with patient     Social Work Initial Assessment     Gender:  female    Race/Ethnicity: (erinn all that apply)  []  American [place] or [place] Native  []    []  Black or [place] American  []   or   []  [address] [facility] or [facility]  [x]  Ash Glaserson  []  Unknown      Service:    []  Yes   [x]  No       []  Unknown  Appropriate for Pinning Ceremony:   []  Yes      [x]  No  Is patient using VA benefits?    []  Yes      [x]  No     Primary Language: English  []   Needed  []   utilized during visit    Ability to express thoughts/needs/feelings  [x]  Expressed thoughts/feelings/needs without difficulty  []  Requires extra time and cuing  []  Speech limited single words  []  Uses only gestures (eye, blinking eye or head movement/pointing)  []  Unable to express thoughts/feelings/needs (speech unintelligible or inappropriate)  []  Unresponsive  Notes:      Mental Status:  [x]  Alert-oriented to:     [x]  Person     [x]  Place     [x]  Time  []  Comatose-responds to:    []   Verbal stimuli    []  Tactile stimuli    []  Painful stimuli  []  Forgetful  []  Disoriented/Confused  []  Lethargic  []  Agitated  []  Other (specify):    Notes:      Patients description of Illness/Current Health Status:    []  Patient unable to discuss  []  Patient unwilling to discuss  [x]  (Specify)    Patient understanding that she is at EOL and shared that she wants to have quality of life vs quantity    Knowledge/Understanding of Disease Process  Patient:    [x] Demonstrates knowledge/understanding of disease process   [x]  Demonstrates knowledge/understanding of treatment plan   [x]  Demonstrates knowledge/understanding of prognosis   [x]  Demonstrates acceptance of prognosis   [x]  Demonstrates knowledge/understanding of resuscitation status   []  Other (specify)  Caregiver:   []  Demonstrates knowledge/understanding of disease process   []  Demonstrates knowledge/understanding of treatment plan   []  Demonstrates knowledge/understanding of prognosis   []  Demonstrates acceptance of prognosis   []  Demonstrates knowledge/understanding of resuscitation status   []  Other (specify)  Notes:      Patients living arrangement/care setting:  Use the PRIOR COLUMN when the PATIENTS current health status necessitated a change in his/her primary residence. Prior Current Response              [x]             []    Patients own home/residence              []             []    Home of family member/friend              []             []    Boarding home              []             []    Assisted living facility/residential center              []             []    Hospital/Acute care facility              []             []    Skilled nursing facility              []             []    Long term care facility/Nursing home              []             [x]    Hospice in Patient      Primary Caregiver:  []  No Primary Caregiver  Name of Primary Caregiver: Haven Less  Relationship or Primary Caregiver:    []  Spouse/Significant other       []  Natural Child        []  Step child       []  Sibling   []  Parent   [x]  Friend/Neighbor/MPOA   []  Community/Tenriism Volunteer   []  Paid help   []  Other (specify):___________  Notes:       Family members/Significant others:  Name: Haven Less  Relationship: friend/MPOA  Phone Number: [phone]  Actively involved in care? [x]  Yes  []  No    Name: Regina Deluna  Relationship: brother  Phone Number: [phone]  Actively involved in care?   [] Yes  [x]  No    Name:  Relationship:  Phone Number:  Actively involved in care? []  Yes  []  No    Social support systems: (select ONE best description)  [x]  Excellent social support system which includes three or more family members or friends  []  Good social support system which includes two or less members or friends  []  [address] support which includes one family member or friend  []  Poor social support; no family members or friends; basically ALONE  Notes:      Emotional Status: (erinn all that apply)    Patient Caregiver Response                 [x]                []    Mood/Affect stable and appropriate                   []                []    Angry                 []                []    Anxious                 []                []    Apprehensive                 []                []    Avoidant                 []                []    Clinging                 []                []    Depressed                 []                []    Distraught                 []                []    Elated                 []                []    Euphoric                 []                []    Fearful                 []                []    Flat Affect                 []                []    Helpless                 []                []    Hostile                 []                []    Impulsive                 []                []    Irritable                 []                []    Labile                 []                []    Manic                 []                []    Restlessness                 []                []    Sad                 []                []    Suspicious                 []                []    Tearful                 []                []    Withdrawn     Notes:     Coping Skills (strengths/weakness):    Patient: Coping Skills (strength/weakness): Patient has excellent support from family and friends.     Family/caregiver (strength/weakness):  Family and friends have good support for themselves     [place] of care (erinn all that apply):     [x]  No burden evident   []  Family must administer medications   []  Illness causing financial strain   []  Family/Support feels overwhelmed   []  Family/Support sleep disturbed with patients care   []  Patients care causes extra physical stress  of death  []  Illness causes changes in family lifestyle  []  Illness impacting family/support employment  []  Family experiencing increased time demands  []  Patients behavior endangers family  []  Denial of patients illness  []  Concern over outcome of illness/fear  []  Patients behavior embarrassing to family   Notes:      Risk Factors: (erinn all that apply):    [x]  No burden evident   []  Alcohol abuse  []  Financial resources inadequate to meet basic needs (food/house/etc)  []  Financial resources inadequate to meet health care needs (supplies/equipment/medications)  []  Food/nutrition resources inadequate  []  Home environment unsafe/inadequate for home care  []  Homicidal risk  []  Lives alone or without concerned relatives  []  Multiple medications/complex schedule  []  Physical limitations increase likelihood of falls  []  Plan of care/treatments complicated  []  Substance use/abuse  []  Suicidal risk  []  Visual impairment threatens safety/ability to perform self-care  []  Other (specify):     Abuse/Neglect (actual/potential risks):  [x]  No signs of abuse/neglect  []  History of abuse/neglect                 []  SYYWXOXE          []  Sexual  []  History of domestic violence  []  Lacks adequate physical care  []  Lacks emotional nurturing/support  []  Lacks appropriate stimulation/cognitive experiences  []  Left alone inappropriately  []  Lacks necessary supervision  []  Inadequate or delayed medical care  []  Unsafe environment (i.e guns/drug use/history of violence in the home/etc.)  []  Bruising or other physical signs of injury present  []  Other (specify):  Notes:   []  Refer to child/adult protective services      Current Sources of Stress (in Addition to Current Illness):   [x]  None reported  []  Bills/Debt    []  Career/Job change    []   (short term)    []   (long term)    []  Death of a child (recent)    []  Death of a parent (recent)   []  Death of a spouse (recent)   []  Employment status changed   []  Family discord    []  Financial loss/Inadequate inther (specify):come  []  Job loss  []  Legal issues unresolved  []  Lifestyle change  []  Marital discord  []  Marriage within the last year  []  Paperwork (insurance/legal/etc) overwhelming  []  Separation/Divorce  []  Other (specify):  Notes:      Current [employer] Being Utilized     1. FAP completed        Interventions/Plan of Care     1. Assess social and emotional factors related to coping with end of life issues  2. Community resource planning/referral   3. Relocation to different care setting if/when symptoms stabilize      Discharge Planning     1.  Patient is likely to pass in the hospital. MSW will work with patient and MPOA and friends/family on discharge plan if needed    MSW Assessment Completed by: PEDRO Rendon  10/19/21    Time In: 4:00pm      Time Out: 4:40pm

## 2021-10-20 NOTE — PROGRESS NOTES
TRANSFER - OUT REPORT:    Verbal report given to MICHEAL Rust on Rakesh Smart  being transferred to [facility] for routine progression of care       Report consisted of patients Situation, Background, Assessment and   Recommendations(SBAR). Information from the following report(s) SBAR, Kardex and MAR was reviewed with the receiving nurse. Lines:   Peripheral IV Left;Posterior Hand (Active)   Site Assessment Clean, dry, & intact 10/20/21 0921   Phlebitis Assessment 0 10/20/21 0921   Infiltration Assessment 0 10/20/21 0921   Dressing Status Clean, dry, & intact 10/20/21 0921   Dressing Type Transparent 10/20/21 0921   Hub Color/Line Status Capped 10/20/21 3882   Action Taken Open ports on tubing capped 10/20/21 0921   Alcohol Cap Used Yes 10/20/21 6633        Opportunity for questions and clarification was provided.       Patient transported with:  ASHLEY

## 2021-10-20 NOTE — PROGRESS NOTES
[facility]  [phone]    Patient Name: Tisha Arteaga  YOB: 1960    Date of Provider Hospice Visit: 10/20/21    Level of Care:   [x] General Inpatient (GIP)    [] Routine   [] Respite    Current Location of Care:  [x] [facility] [] [address] [] [facility] [] [facility] [] Hospice House THE [facility], patient referred from:  [] [facility] [] [address] [] [facility] [] [facility] [] Home [] Other:     Date of Original Hospice Admission: 10/19/2021  Hospice Medical Director at time of admission: [email or web address] Diagnosis: Metastatic breast cancer  Diagnoses RELATED to the terminal prognosis: Sepsis  Other Diagnoses:      Cheryl Najjar is a 57y.o. year old who was admitted to [address] [place] [address]. Patient is a 41-year-old female admitted to [facility] level care secondary to metastatic breast cancer. Patient admitted to [facility] on 10/18/2021. Patient found to have a white count of 73,000 and not sought health care for over 20 years. She is found to have a large right breast mass that is fungating as well as advanced into the lymph nodes. She had been doing \"gently deworming \"/herbal medications. She was admitted and started on IV antibiotics and her white count did decrease to 43,000 but she elected not to pursue any further treatment and wanted to focus on comfort. CT scan showed large ulcerated right breast/chest wall mass with associated intercostal involvement, pleural involvement, rib involvement as well as concern about hepatic lesions. She also is found of a blood pressure in the 70s despite IV fluids, IV antibiotics. Given her fairly rapid decline, symptom burden, and high risk/unsafe transfer home/difficult caregiving situation, patient was admitted [facility] level care for further management.     The patient's principle diagnosis has resulted in hypotension/pain/leukocytosis  Refer to LCD     Functionally, the patient's Karnofsky and/or Palliative Performance Scale has declined over a period of years and is estimated at 20 the patient is dependent on the following ADLs: Able to feed herself    Objective information that support this patients limited prognosis includes:   CT scansee note above. The patient/family chose comfort measures with the support of Hospice. HOSPICE DIAGNOSES   Active Symptoms:  1. Cancer associated pain  2. Large right breast wound that also extends into the sternal area  3. Hospice care  4. Leukocytosis     PLAN   1. Patient will continue GIP level care today secondary to frequent nursing assessment, medication adjustment, significant wound care given the extent of the breast lesion. Patient actually more awake and alert today than she was yesterday in the ICU. Her blood pressure actually is higher today than when she was in the ICU as she was receiving IV fluids and IV antibiotics. Certainly remains at high risk to have a rapid decline given that her white count was almost 75,000 when she was admitted. Having said that, if she remains this stable by tomorrow, will need to think about transition to routine level care and transfer to the hospice house. 2. Continue morphine 2 mg IV every 4 hours scheduled every 15 minutes as needed. May be able to transition this to sublingual starting tomorrow. 3. Comfort meds for all other symptoms  4. Continue wound care with [employer] twice daily to the wound  5. Plan reviewed with bedside nursing team, hospice liaisonMonica, patient    6.  and SW to support family needs  7. Disposition: Hopefully can transition to the community hospice house  8. Hospice Plan of care was reviewed in detail and agree with current plan of care    Prognosis estimated based on 10/20/21 clinical assessment is:   [] Hours to Days    [x] Days to Weeks I think weeks is unlikely  [] Other:    Communicated plan of care with: Hospice Case Manager;  Hospice IDT; Care Team     GOALS OF CARE Patient/Medical POA stated Goal of Care: Hospice care    [x] I have reviewed and/or updated ACP information in the Advance Care Planning Navigator. This information is available in the [address] link in the patient's chart header. Primary Decision [facility] ([phone]):   Primary Decision Maker: Arron Mcdaniel - [phone]    Resuscitation Status: DNR  If DNR is there a Durable DNR on file? : [x] Yes [] No (If no, complete Durable DNR)    HISTORY     History obtained from: Chart, patient, hospice liaison    CHIEF COMPLAINT: Right breast wound  The patient is:   [x] Verbal  [] Nonverbal  [] Unresponsive    HPI/SUBJECTIVE: Patient is alert and oriented. She is able to answer all questions. She admits to having some pain to the right chest wall although she did just received morphine. She has been eating very little and admits to significant weight loss. Wound has been extending significantly but currently covered    10/20patient is awake and interactive. Denies any pain currently. She slept for 11 hours last night which was the first time she slept in many days. She is eating a little. Still having some discomfort in the right breast area but overall has been controlled with the morphine. REVIEW OF SYSTEMS     The following systems were: [x] reviewed  [] unable to be reviewed    Positive ROS include:  Constitutional: fatigue, weakness, in pain, short of breath  Ears/nose/mouth/throat: increased airway secretions  Respiratory:shortness of breath, wheezing  Gastrointestinal:poor appetite, nausea, vomiting, abdominal pain, constipation, diarrhea  Musculoskeletal:pain, deformities, swelling legs  Neurologic:confusion, hallucinations, weakness  Psychiatric:anxiety, feeling depressed, poor sleep  Endocrine:     Adult Non-Verbal Pain Assessment Score:    She can verbalize her pain as 2 out of 10 at times.     Face  [] 0   No particular expression or smile  [] 1   Occasional grimace, tearing, frowning, wrinkled forehead  [] 2   Frequent grimace, tearing, frowning, wrinkled forehead    Activity (movement)  [] 0   Lying quietly, normal position  [] 1   Seeking attention through movement or slow, cautious movement  [] 2   Restless, excessive activity and/or withdrawal reflexes    Guarding  [] 0   Lying quietly, no positioning of hands over areas of body  [] 1   Splinting areas of the body, tense  [] 2   Rigid, stiff    Physiology (vital signs)  [] 0   Stable vital signs  [] 1   Change in any of the following: SBP > 20mm Hg; HR > 20/minute  [] 2   Change in any of the following: SBP > 30mm Hg; HR > 25/minute    Respiratory  [] 0   Baseline RR/SpO2, compliant with ventilator  [] 1   RR > 10 above baseline, or 5% drop SpO2, mild asynchrony with ventilator  [] 2   RR > 20 above baseline, or 10% drop SpO2, asynchrony with ventilator     FUNCTIONAL ASSESSMENT     Palliative Performance Scale (PPS): 20-30       PSYCHOSOCIAL/SPIRITUAL ASSESSMENT     Active Problems:    Breast mass (10/18/2021)      No past medical history on file. Past Surgical History:   Procedure Laterality Date    HX CATARACT REMOVAL        Social History     Tobacco Use    Smoking status: Never Smoker    Smokeless tobacco: Never Used   Substance Use Topics    Alcohol use: Not on file     No family history on file.    No Known Allergies   Current Facility-Administered Medications   Medication Dose Route Frequency    LORazepam (ATIVAN) injection 1 mg  1 mg IntraVENous Q15MIN PRN    glycopyrrolate (ROBINUL) injection 0.2 mg  0.2 mg IntraVENous Q4H PRN    bisacodyL (DULCOLAX) suppository 10 mg  10 mg Rectal DAILY PRN    morphine injection 2 mg  2 mg IntraVENous Q15MIN PRN    acetaminophen (TYLENOL) tablet 650 mg  650 mg Oral Q4H PRN    Or    acetaminophen (TYLENOL) solution 650 mg  650 mg Oral Q4H PRN    Or    acetaminophen (TYLENOL) suppository 650 mg  650 mg Rectal Q4H PRN    morphine injection 2 mg  2 mg IntraVENous Q4H    metroNIDAZOLE (METROGEL) 0.75 % gel   Topical BID        PHYSICAL EXAM     Wt Readings from Last 3 Encounters:   10/19/21 48 kg (105 lb 13.1 oz)       Visit Vitals  BP (!) 76/55 (BP 1 Location: Right upper arm, BP Patient Position: Supine; At rest)   Pulse (!) 110   Temp 97.6 °F (36.4 °C)   Resp 18   SpO2 98%       Supplemental O2  [] Yes  [x] NO  Last bowel movement:     Currently this patient has:  [x] Peripheral IV [] PICC  [] PORT [] ICD    [] Hayden Catheter [] NG Tube   [] PEG Tube    [] Rectal Tube [] Drain  [] Other:     Constitutional: Thin, pale, alert and oriented  Eyes: Reactive  ENMT: Slightly dry  Cardiovascular: Regular rate and rhythm  Respiratory: Clear to auscultation  Gastrointestinal: Soft, thin  Musculoskeletal: Profound muscle wasting  Skin: Her large right breast wound is covered and this does appear to extend into the sternal area, profound drainage  Neurologic: Nonfocal  Psychiatric: Calm  Other:       Pertinent Lab and or Imaging Tests:  Lab Results   Component Value Date/Time    Sodium 135 (L) 10/19/2021 06:35 AM    Potassium 4.1 10/19/2021 06:35 AM    Chloride 112 (H) 10/19/2021 06:35 AM    CO2 10 (LL) 10/19/2021 06:35 AM    Anion gap 13 10/19/2021 06:35 AM    Glucose 53 (LL) 10/19/2021 06:35 AM    BUN 46 (H) 10/19/2021 06:35 AM    Creatinine 0.75 10/19/2021 06:35 AM    BUN/Creatinine ratio 61 (H) 10/19/2021 06:35 AM    GFR est AA >60 10/19/2021 06:35 AM    GFR est non-AA >60 10/19/2021 06:35 AM    Calcium 7.8 (L) 10/19/2021 06:35 AM     Lab Results   Component Value Date/Time    Protein, total 3.6 (L) 10/19/2021 06:35 AM    Albumin 1.4 (L) 10/19/2021 06:35 AM           Total time:   Counseling / coordination time:   > 50% counseling / coordination?:

## 2021-10-20 NOTE — HOSPICE
[address] Transfer to [facility]:     MSW met with pt/family to arrange transfer to [facility] for EOL care. Consents; Completed and scanned in chart. Visitor agreement needs to be completed. LMSW called pt's friend/MICHAEL Hobbs and left a voicemail. DDNR; Yes on chart. Will travel with pt    Disposition; Pt lives alone with a roommate. Pt has not seen a doctor in years and used natural remedies/treatments. Psychosocial needs; alert and oriented, very pleasant    Transportation; Hospital to Home, 8 pm    FH; TBD, discussed with MSW on 10/19. Pt interested in cremation    Discharge Plan Discussed: Will need MSW involvement in discharge planning. FAP form started for potential routine at [facility].

## 2021-10-20 NOTE — HOSPICE
MSW made info visit with patient. Patient is alert and oriented. MSW completed FAP with patient. Patient does not have income at this time, but does have a some funds in the bank. Patient will get a friend to send MSW her bank statement as patient was unable to download it on her phone. Patient's sister also present during the visit. Patient reports that she has wonderful support from family and friends. 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 Patient shared that she would be interested in applying for Medicaid. Patient is open to receiving hospice support. Patient shared that her friend Marquis Dexter is her MPOA, MSW got copy of patient's Advanced Directive for patient's chart. Lady Arnett, is primary and patient's brother is secondary.     PEDRO Julian  [facility]

## 2021-10-20 NOTE — WOUND CARE
Requested assistance for dressing access after transfer from ICU. She reports success with dressings chosen yesterday. Wounds redressed as documented yesterday. Will continue to follow.   MATTY Simms

## 2021-10-21 NOTE — PROGRESS NOTES
Problem: Falls - Risk of  Goal: *Absence of Falls  Description: Document Gabe Sol Fall Risk and appropriate interventions in the flowsheet. Outcome: Progressing Towards Goal  Note: Fall Risk Interventions:  Mobility Interventions: Bed/chair exit alarm, Patient to call before getting OOB         Medication Interventions: Bed/chair exit alarm    Elimination Interventions: Bed/chair exit alarm, Call light in reach, Toileting schedule/hourly rounds              Problem: Pressure Injury - Risk of  Goal: *Prevention of pressure injury  Description: Document Tay Scale and appropriate interventions in the flowsheet. Outcome: Progressing Towards Goal  Note: Pressure Injury Interventions:       Moisture Interventions: Absorbent underpads, Maintain skin hydration (lotion/cream), Contain wound drainage    Activity Interventions: Increase time out of bed, Pressure redistribution bed/mattress(bed type)    Mobility Interventions: HOB 30 degrees or less, Pressure redistribution bed/mattress (bed type)    Nutrition Interventions: Document food/fluid/supplement intake, Offer support with meals,snacks and hydration                     Problem: Pain  Goal: Assess satisfaction of level of comfort and symptom control  Outcome: Progressing Towards Goal  Goal: *Control of acute pain  Outcome: Progressing Towards Goal     Problem: General Wound Care  Goal: *Infected Wound: Prevention of further infection  Description: Infection control procedures (eg: clean dressings, clean gloves, hand washing, precautions to isolate wound from contamination, sterile instruments used for wound debridement) should be implemented.   Outcome: Progressing Towards Goal

## 2021-10-21 NOTE — HOSPICE
2045 Patient arrived via stretcher to [facility]. Patient A&Ox4. Lung sounds clear, active bowel sounds, reports her last BM was 3-4 days ago. PIV intact to left hand. Patient prefers not to wear a gown. Yaya pads cover the wound to patients right chest, has a moderate amount of serous drainange. Bandage to left arm covers a skin tear, rest of skin intact. 2145 Patient stood and pivot with two person assist to the [facility] to void. Patient returned to bed, pillows placed under both sides. Patient missed a dose of morphine at [facility], medicated with prn dilaudid. Bed alarm engaged, lights dimmed, call bell in reach. 2250 Patient resting quietly in bed with eyes closed. Respirations unlabored, neutral facial expression. 2345 Patient resting quietly in bed, appears to be sleeping. Respirations unlabored. 0045 Patient in bed sleeping, snoring. Relaxed facial expression. 0130 Patient continues to sleep. Neutral facial expression, arms relaxed across her abdomen. 0230 Patient sleeping, respirations unlabored, facial expression relaxed. 0330 Patient up to the [facility] to void. Received a bath by CNA. Administered scheduled IV morphine for pain. Wound care completed to right breast wound. 0430 Patient in bed sleeping, respirations unlabored, neutral facial expression. 0530 Patient in bed sleeping, snoring at times. Respirations regular rate and rhythm. 0630 Patient resting with eyes closed, no facial grimacing noted.    0700 Report given to [employer], RN    NAME OF PATIENT:  Samantha Byers    LEVEL OF CARE:  GIP    REASON FOR GIP:   Pain, despite numerous changes in medications, Medication adjustment that must be monitored 24/7 and Stabilizing treatment that cannot take place at home    *PATIENT REMAINS ELIGIBLE FOR GIP LEVEL OF CARE AS EVIDENCED BY: (MUST BE ADDRESSED OF PATIENT GIP)      REASON FOR RESPITE:  n/a    O2 SAFETY:  n/a    FALL INTERVENTIONS PROVIDED:   Implemented/recommended use of non-skid footwear, Implemented/recommended use of fall risk identification flag to all team members, Implemented/recommended resources for alarm system (personal alarm, bed alarm, call bell, etc.) , Implemented/recommended environmental changes (remove hazards, lower bed, improve lighting, etc.) and Implemented/recommended increased supervision/assistance    INTERDISPLINARY COMMUNICATION/COLLABORATION:  Physician, MSW, Leena and RN, CNA    NEW MEDICATION INITIATION DOCUMENTATION:  n/a    Reason medication is being initiated:  n/a    MD / Provider name consulted re: change in status / initiation of new medication:  n/a    New Symptom(s):  n/a    New Order(s):  n/a    Name of the person notified of the changes:  n/a    Name of person being taught:  n/a    Instructions given:  n/a    Side Effects taught:  n/a    Response to teaching:  n/a      COMFORTABLE DYING MEASURE:  Is Patient/family satisfied with symptom level?  yes    DISCHARGE PLAN:  Patient will remain GIP level of care until symptoms can be managed. If patient stabilizes, pt will return home.

## 2021-10-21 NOTE — H&P
[facility]  [phone]    Patient Name: Andrew Shine  YOB: 1960    Date of Provider Hospice Visit: 10/21/21    Level of Care:   [] General Inpatient (GIP)    [x] Routine   [] Respite    Current Location of Care:  [x] [facility] [] [address] [] [facility] [] [address] [] Hospice House [facility], patient referred from:  [] [facility] [] [address] [] [facility] [] [address] [] Home [] Other:     Date of Original Hospice Admission: 10/19/2021  Hospice Medical Director at time of admission: [id number] [email or web address] Diagnosis: Metastatic breast cancer  Diagnoses RELATED to the terminal prognosis: Sepsis  Other Diagnoses:      Geneva Browning is a 57y.o. year old who was admitted to [facility]. Patient is a 41-year-old female admitted to [facility] level care secondary to metastatic breast cancer. Patient admitted to [facility] on 10/18/2021. Patient found to have a white count of 73,000 and not sought health care for over 20 years. She is found to have a large right breast mass that is fungating as well as advanced into the lymph nodes. She had been doing \"gently deworming \"/herbal medications. She was admitted and started on IV antibiotics and her white count did decrease to 43,000 but she elected not to pursue any further treatment and wanted to focus on comfort. CT scan showed large ulcerated right breast/chest wall mass with associated intercostal involvement, pleural involvement, rib involvement as well as concern about hepatic lesions. She also is found of a blood pressure in the 70s despite IV fluids, IV antibiotics. Given her fairly rapid decline, symptom burden, and high risk/unsafe transfer home/difficult caregiving situation, patient was admitted [facility] care for further management.     The patient's principle diagnosis has resulted in hypotension/pain/leukocytosis  Refer to LCD     Functionally, the patient's Karnofsky and/or Palliative Performance Scale has declined over a period of years and is estimated at 20 the patient is dependent on the following ADLs: Able to feed herself    Objective information that support this patients limited prognosis includes:   CT scan-see note above. The patient/family chose comfort measures with the support of Hospice. HOSPICE DIAGNOSES   Active Symptoms:  1. Cancer associated pain  2. Large right breast wound that also extends into the sternal area  3. Hospice care  4. Leukocytosis     PLAN   1. Patient will transition to routine LOC. Her BP in the 60s this morning but she is awake and alert. Pain is minimal and IV has infiltrated. 2. Morphine changed to SL morphine 5 mg and for now changed to prn  3. Comfort meds for all other symptoms  4. Continue wound care with [employer] twice daily to the wound. Will talk with bedside nursing team about options for wound care. 5. Plan reviewed with bedside nursing team    6.  and SW to support family needs  7. Disposition: TBD-will need to talk with SW team as her living situation may be difficult for her to transition home  8. Hospice Plan of care was reviewed in detail and agree with current plan of care    Prognosis estimated based on 10/21/21 clinical assessment is:   [] Hours to Days    [x] Days to Weeks I think weeks is unlikely  [] Other:    Communicated plan of care with: Hospice Case Manager; Hospice IDT; Care Team     GOALS OF CARE     Patient/Medical POA stated Goal of Care: Hospice care    [x] I have reviewed and/or updated ACP information in the Advance Care Planning Navigator. This information is available in the [address] link in the patient's chart header.     Primary Decision Maker ([phone]):   Primary Decision Maker: Barbi Shukla - Friend - [phone]    Resuscitation Status: DNR  If DNR is there a Durable DNR on file? : [x] Yes [] No (If no, complete Durable DNR)    HISTORY     History obtained from: Chart, patient, hospice liaison    CHIEF COMPLAINT: Right breast wound  The patient is:   [x] Verbal  [] Nonverbal  [] Unresponsive    HPI/SUBJECTIVE: Patient is alert and oriented. She is able to answer all questions. She admits to having some pain to the right chest wall although she did just received morphine. She has been eating very little and admits to significant weight loss. Wound has been extending significantly but currently covered    10/20-patient is awake and interactive. Denies any pain currently. She slept for 11 hours last night which was the first time she slept in many days. She is eating a little. Still having some discomfort in the right breast area but overall has been controlled with the morphine. 10/21-patient is awake, denies minimal pain at this time       REVIEW OF SYSTEMS     The following systems were: [x] reviewed  [] unable to be reviewed    Positive ROS include:  Constitutional: fatigue, weakness, in pain, short of breath  Ears/nose/mouth/throat: increased airway secretions  Respiratory:shortness of breath, wheezing  Gastrointestinal:poor appetite, nausea, vomiting, abdominal pain, constipation, diarrhea  Musculoskeletal:pain, deformities, swelling legs  Neurologic:confusion, hallucinations, weakness  Psychiatric:anxiety, feeling depressed, poor sleep  Endocrine:     Adult Non-Verbal Pain Assessment Score:    She can verbalize her pain as 2 out of 10 at times.     Face  [] 0   No particular expression or smile  [] 1   Occasional grimace, tearing, frowning, wrinkled forehead  [] 2   Frequent grimace, tearing, frowning, wrinkled forehead    Activity (movement)  [] 0   Lying quietly, normal position  [] 1   Seeking attention through movement or slow, cautious movement  [] 2   Restless, excessive activity and/or withdrawal reflexes    Guarding  [] 0   Lying quietly, no positioning of hands over areas of body  [] 1   Splinting areas of the body, tense  [] 2   Rigid, stiff    Physiology (vital signs)  [] 0   Stable vital signs  [] 1   Change in any of the following: SBP > 20mm Hg; HR > 20/minute  [] 2   Change in any of the following: SBP > 30mm Hg; HR > 25/minute    Respiratory  [] 0   Baseline RR/SpO2, compliant with ventilator  [] 1   RR > 10 above baseline, or 5% drop SpO2, mild asynchrony with ventilator  [] 2   RR > 20 above baseline, or 10% drop SpO2, asynchrony with ventilator     FUNCTIONAL ASSESSMENT     Palliative Performance Scale (PPS): 30-40       PSYCHOSOCIAL/SPIRITUAL ASSESSMENT     Active Problems:    * No active hospital problems. *    No past medical history on file. Past Surgical History:   Procedure Laterality Date    HX CATARACT REMOVAL        Social History     Tobacco Use    Smoking status: Never Smoker    Smokeless tobacco: Never Used   Substance Use Topics    Alcohol use: Not on file     No family history on file.    No Known Allergies   Current Facility-Administered Medications   Medication Dose Route Frequency    morphine (ROXANOL) 100 mg/5 mL (20 mg/mL) concentrated solution 5 mg  5 mg SubLINGual Q1H PRN    bisacodyL (DULCOLAX) suppository 10 mg  10 mg Rectal DAILY PRN    LORazepam (ATIVAN) injection 1 mg  1 mg IntraVENous Q15MIN PRN    glycopyrrolate (ROBINUL) injection 0.2 mg  0.2 mg IntraVENous Q4H PRN    acetaminophen (TYLENOL) suppository 650 mg  650 mg Rectal Q4H PRN    metroNIDAZOLE (FLAGYL) tablet 500 mg  500 mg Topical DAILY        PHYSICAL EXAM     Wt Readings from Last 3 Encounters:   10/19/21 48 kg (105 lb 13.1 oz)       Visit Vitals  BP (!) 64/50   Pulse 100   Temp (!) 96.6 °F (35.9 °C)   Resp 20   SpO2 98%       Supplemental O2  [] Yes  [x] NO  Last bowel movement:     Currently this patient has:  [x] Peripheral IV [] PICC  [] PORT [] ICD    [] Hayden Catheter [] NG Tube   [] PEG Tube    [] Rectal Tube [] Drain  [] Other:     Constitutional: Thin, pale, alert and oriented  Eyes: Reactive  ENMT: Slightly dry  Cardiovascular: Regular rate and rhythm  Respiratory: Clear to auscultation  Gastrointestinal: Soft, thin  Musculoskeletal: Profound muscle wasting  Skin: Her large right breast wound is covered and this does appear to extend into the sternal area as well as the axillary region, profound drainage  Neurologic: Nonfocal  Psychiatric: Calm  Other:       Pertinent Lab and or Imaging Tests:  Lab Results   Component Value Date/Time    Sodium 135 (L) 10/19/2021 06:35 AM    Potassium 4.1 10/19/2021 06:35 AM    Chloride 112 (H) 10/19/2021 06:35 AM    CO2 10 (LL) 10/19/2021 06:35 AM    Anion gap 13 10/19/2021 06:35 AM    Glucose 53 (LL) 10/19/2021 06:35 AM    BUN 46 (H) 10/19/2021 06:35 AM    Creatinine 0.75 10/19/2021 06:35 AM    BUN/Creatinine ratio 61 (H) 10/19/2021 06:35 AM    GFR est AA >60 10/19/2021 06:35 AM    GFR est non-AA >60 10/19/2021 06:35 AM    Calcium 7.8 (L) 10/19/2021 06:35 AM     Lab Results   Component Value Date/Time    Protein, total 3.6 (L) 10/19/2021 06:35 AM    Albumin 1.4 (L) 10/19/2021 06:35 AM           Total time:   Counseling / coordination time:   > 50% counseling / coordination?:

## 2021-10-21 NOTE — [facility] IDG OTHER NOTES
HOSPICE -- IDG NOTE -- BEREAVEMENT  During the interdisciplinary group meeting on 10/21/2021, the primary care team reviewed the patient's admission, and bereavement was discussed. It was confirmed that the  is scheduled to meet with this family within the next four days and will assess who is the primary bereaved and what their risk level is. Primary care team to follow.

## 2021-10-21 NOTE — HOSPICE
Initial spiritual care visit with the patient. The patient was sitting up in bed visiting with her brother who left shortly after the  arrived. The patient shared events  From her life, her passion for research and events from her life that have shaped who she is. The patient talked about being on a spiritual journey that led her to people who are important to her and assisted in reconciling with her mother at the end of her mother's life. The patient is at peace with her life and death and talked about end-of-life decisions she has made. The  and patient discussed her beliefs of what happens when we die. The patient would like the  to return for conversation and spiritual support.

## 2021-10-21 NOTE — HOSPICE
1900 Report received from [facility]. 1915 Patient is resting in bed with visitor at bedside. Patient states that she feels good and is in no pain. Please see flow sheet for assessment. 2030 Changed dressing on breast and gave prn dose of Roxanol 5 mg.  2130 Patient is resting in bed watching tv.  2230 Patient is resting in asked that the lights be turned off and door shut. 0020 Patient called out for prn dose of Roxanol 5 mg.   0210 Patient is sleeping with no facial grimacing.  0400 Changed dressing on right breast. Repositioned patient in bed.  0600 Patient is sleeping with a relaxed face. 0700 Report given to Mauricio Interiano RN  NAME OF PATIENT:  Madisyn Walker    LEVEL OF CARE:  Routine    REASON FOR GIP:   NA    *PATIENT REMAINS ELIGIBLE FOR GIP LEVEL OF CARE AS EVIDENCED BY: (MUST BE ADDRESSED OF PATIENT GIP) NA      REASON FOR RESPITE:  Caregiver breakdown    O2 SAFETY:  NA    FALL INTERVENTIONS PROVIDED:   Implemented/recommended use of fall risk identification flag to all team members, Implemented/recommended assistive devices and encouraged their use, Implemented/recommended resources for alarm system (personal alarm, bed alarm, call bell, etc.) , Implemented/recommended environmental changes (remove hazards, lower bed, improve lighting, etc.) and Implemented/recommended increased supervision/assistance    INTERDISPLINARY COMMUNICATION/COLLABORATION:  Physician, Thelma Gonzalez and RN, ALEYDA    NEW MEDICATION INITIATION DOCUMENTATION:  NA    Reason medication is being initiated:  NA    MD / Provider name consulted re: change in status / initiation of new medication:  NA    New Symptom(s):  NA    New Order(s):  NA    Name of the person notified of the changes:  NA    Name of person being taught:  NA    Instructions given:  NA    Side Effects taught:  NA    Response to teaching:  NA      COMFORTABLE DYING MEASURE:  Is Patient/family satisfied with symptom level?  yes    DISCHARGE PLAN:  Home with hospice.

## 2021-10-21 NOTE — DISCHARGE SUMMARY
Hospice Discharge Summary    [employer]  [facility]        Date of Admission: 10/19/2021  Date of Discharge: 10/20/2021    Rakesh King is a 57y.o. year old who was admitted to [facility] [facility] at [facility] with a Hospice diagnosis of Breast mass [N63.0]. The patient was discharged to [facility] for ongoing Hospice care. Renaldo Hardy

## 2021-10-21 NOTE — PROGRESS NOTES
Problem: Falls - Risk of  Goal: *Absence of Falls  Description: Document Rosetta Tolbert Fall Risk and appropriate interventions in the flowsheet. Outcome: Progressing Towards Goal  Note: Fall Risk Interventions:  Mobility Interventions: Bed/chair exit alarm         Medication Interventions: Bed/chair exit alarm    Elimination Interventions: Bed/chair exit alarm              Problem: Patient Education: Go to Patient Education Activity  Goal: Patient/Family Education  Outcome: Progressing Towards Goal     Problem: Pressure Injury - Risk of  Goal: *Prevention of pressure injury  Description: Document Tay Scale and appropriate interventions in the flowsheet. Outcome: Progressing Towards Goal  Note: Pressure Injury Interventions:       Moisture Interventions: Absorbent underpads    Activity Interventions: Increase time out of bed    Mobility Interventions: HOB 30 degrees or less    Nutrition Interventions: Document food/fluid/supplement intake                     Problem: Pain  Goal: Assess satisfaction of level of comfort and symptom control  Outcome: Progressing Towards Goal  Goal: *Control of acute pain  Outcome: Progressing Towards Goal     Problem: General Wound Care  Goal: *Non-infected wound: Improvement of existing wound, absence of infection, and maintenance of skin integrity  Outcome: Progressing Towards Goal  Goal: *Infected Wound: Prevention of further infection  Description: Infection control procedures (eg: clean dressings, clean gloves, hand washing, precautions to isolate wound from contamination, sterile instruments used for wound debridement) should be implemented.   Outcome: Progressing Towards Goal  Goal: Interventions  Outcome: Progressing Towards Goal

## 2021-10-21 NOTE — PROGRESS NOTES
EVONNEW met with pt at bedside along with her brother, Arnold Styles who lives in [place]. Pt is one of 6 siblings, she and Arnold Styles are the youngest. Pt shared about some of her siblings and her philosophies about fundamental needs for our body. She expressed great appreciation for the hospice house and the care being provided. LCSW reviewed the items that are needed for her FAP referral. Pt will get a letter from friend, Smiley Iron to speak to the support she receives from friends financially. EVONNEW also provided her with documents to designate friend/MICHAEL Madden for her final arrangements if this would be helpful as Elmer is making many of the plans. We reviewed her wishes which are for cremation and then scattering of her ashes. Maryanne plans to carry this out but unclear of the cremation provider at this time. EVONNEW reviewed with pt that she was switched to routine LOC today and that we do have limited time at [facility] at this LOC. She states she is welcome back at her home where she lives with a gentleman in his [de-identified]. Her roommate is able to provide her with food, but is not able to do overnight care for her. She does have several close friends who had discussed the possibility of rotating care for her during the day. EVONNEW placed a call to Elmer to f/u regarding cremation provider and discharge planning. EVONNEW will await word back from Elmer and will assist with dc planning if pt remains stable next week.     PEDRO Solano, [facility]   [phone]

## 2021-10-21 NOTE — PROGRESS NOTES
[address] [facility] Those in Need  [phone]    Patient Name: Arron Lesches  YOB: 1960    Date of Provider Hospice Visit: 10/21/21    Level of Care:   [] General Inpatient (GIP)    [x] Routine   [] Respite    Current Location of Care:  [x] [facility] [] [facility] [] [facility] [] [facility] [] Hospice House [facility], patient referred from:  [] [facility] [] [facility] [] [facility] [] [facility] [] Home [] Other:     Date of Original Hospice Admission: 10/19/2021  Hospice Medical Director at time of admission: [employer] Diagnosis: Metastatic breast cancer  Diagnoses RELATED to the terminal prognosis: Sepsis  Other Diagnoses:      Dennie Amend is a 57y.o. year old who was admitted to [facility]. Patient is a 77-year-old female admitted to [facility] care secondary to metastatic breast cancer. Patient admitted to [facility] on 10/18/2021. Patient found to have a white count of 73,000 and not sought health care for over 20 years. She is found to have a large right breast mass that is fungating as well as advanced into the lymph nodes. She had been doing \"gently deworming \"/herbal medications. She was admitted and started on IV antibiotics and her white count did decrease to 43,000 but she elected not to pursue any further treatment and wanted to focus on comfort. CT scan showed large ulcerated right breast/chest wall mass with associated intercostal involvement, pleural involvement, rib involvement as well as concern about hepatic lesions. She also is found of a blood pressure in the 70s despite IV fluids, IV antibiotics. Given her fairly rapid decline, symptom burden, and high risk/unsafe transfer home/difficult caregiving situation, patient was admitted [facility] level care for further management.     The patient's principle diagnosis has resulted in hypotension/pain/leukocytosis  Refer to LCD     Functionally, the patient's Karnofsky and/or Palliative Performance Scale has declined over a period of years and is estimated at 20 the patient is dependent on the following ADLs: Able to feed herself    Objective information that support this patients limited prognosis includes:   CT scan-see note above. The patient/family chose comfort measures with the support of Hospice. HOSPICE DIAGNOSES   Active Symptoms:  1. Cancer associated pain  2. Large right breast wound that also extends into the sternal area  3. Hospice care  4. Leukocytosis     PLAN   1. Patient will transition to routine LOC. Her BP in the 60s this morning but she is awake and alert. Pain is minimal and IV has infiltrated. 2. Morphine changed to SL morphine 5 mg and for now changed to prn  3. Comfort meds for all other symptoms  4. Continue wound care with [employer] twice daily to the wound. Will talk with bedside nursing team about options for wound care. 5. Plan reviewed with bedside nursing team    6.  and SW to support family needs  7. Disposition: TBD-will need to talk with SW team as her living situation may be difficult for her to transition home  8. Hospice Plan of care was reviewed in detail and agree with current plan of care    Prognosis estimated based on 10/21/21 clinical assessment is:   [] Hours to Days    [x] Days to Weeks I think weeks is unlikely  [] Other:    Communicated plan of care with: Hospice Case Manager; Hospice IDT; Care Team     GOALS OF CARE     Patient/Medical POA stated Goal of Care: Hospice care    [x] I have reviewed and/or updated ACP information in the Advance Care Planning Navigator. This information is available in the [facility] [address] link in the patient's chart header.     Primary Decision Maker ([address]):   Primary Decision Maker: Rocio Mcdaniel - [phone]    Resuscitation Status: DNR  If DNR is there a Durable DNR on file? : [x] Yes [] No (If no, complete Durable DNR)    HISTORY     History obtained from: Chart, patient, hospice liaison    CHIEF COMPLAINT: Right breast wound  The patient is:   [x] Verbal  [] Nonverbal  [] Unresponsive    HPI/SUBJECTIVE: Patient is alert and oriented. She is able to answer all questions. She admits to having some pain to the right chest wall although she did just received morphine. She has been eating very little and admits to significant weight loss. Wound has been extending significantly but currently covered    10/20-patient is awake and interactive. Denies any pain currently. She slept for 11 hours last night which was the first time she slept in many days. She is eating a little. Still having some discomfort in the right breast area but overall has been controlled with the morphine. 10/21-patient is awake, denies minimal pain at this time       REVIEW OF SYSTEMS     The following systems were: [x] reviewed  [] unable to be reviewed    Positive ROS include:  Constitutional: fatigue, weakness, in pain, short of breath  Ears/nose/mouth/throat: increased airway secretions  Respiratory:shortness of breath, wheezing  Gastrointestinal:poor appetite, nausea, vomiting, abdominal pain, constipation, diarrhea  Musculoskeletal:pain, deformities, swelling legs  Neurologic:confusion, hallucinations, weakness  Psychiatric:anxiety, feeling depressed, poor sleep  Endocrine:     Adult Non-Verbal Pain Assessment Score:    She can verbalize her pain as 2 out of 10 at times.     Face  [] 0   No particular expression or smile  [] 1   Occasional grimace, tearing, frowning, wrinkled forehead  [] 2   Frequent grimace, tearing, frowning, wrinkled forehead    Activity (movement)  [] 0   Lying quietly, normal position  [] 1   Seeking attention through movement or slow, cautious movement  [] 2   Restless, excessive activity and/or withdrawal reflexes    Guarding  [] 0   Lying quietly, no positioning of hands over areas of body  [] 1   Splinting areas of the body, tense  [] 2   Rigid, stiff    Physiology (vital signs)  [] 0   Stable vital signs  [] 1   Change in any of the following: SBP > 20mm Hg; HR > 20/minute  [] 2   Change in any of the following: SBP > 30mm Hg; HR > 25/minute    Respiratory  [] 0   Baseline RR/SpO2, compliant with ventilator  [] 1   RR > 10 above baseline, or 5% drop SpO2, mild asynchrony with ventilator  [] 2   RR > 20 above baseline, or 10% drop SpO2, asynchrony with ventilator     FUNCTIONAL ASSESSMENT     Palliative Performance Scale (PPS): 30-40       PSYCHOSOCIAL/SPIRITUAL ASSESSMENT     Active Problems:    * No active hospital problems. *    No past medical history on file. Past Surgical History:   Procedure Laterality Date    HX CATARACT REMOVAL        Social History     Tobacco Use    Smoking status: Never Smoker    Smokeless tobacco: Never Used   Substance Use Topics    Alcohol use: Not on file     No family history on file.    No Known Allergies   Current Facility-Administered Medications   Medication Dose Route Frequency    morphine (ROXANOL) 100 mg/5 mL (20 mg/mL) concentrated solution 5 mg  5 mg SubLINGual Q1H PRN    bisacodyL (DULCOLAX) suppository 10 mg  10 mg Rectal DAILY PRN    LORazepam (ATIVAN) injection 1 mg  1 mg IntraVENous Q15MIN PRN    glycopyrrolate (ROBINUL) injection 0.2 mg  0.2 mg IntraVENous Q4H PRN    acetaminophen (TYLENOL) suppository 650 mg  650 mg Rectal Q4H PRN    metroNIDAZOLE (FLAGYL) tablet 500 mg  500 mg Topical DAILY        PHYSICAL EXAM     Wt Readings from Last 3 Encounters:   10/19/21 48 kg (105 lb 13.1 oz)       Visit Vitals  BP (!) 64/50   Pulse 100   Temp (!) 96.6 °F (35.9 °C)   Resp 20   SpO2 98%       Supplemental O2  [] Yes  [x] NO  Last bowel movement:     Currently this patient has:  [x] Peripheral IV [] PICC  [] PORT [] ICD    [] Hayden Catheter [] NG Tube   [] PEG Tube    [] Rectal Tube [] Drain  [] Other:     Constitutional: Thin, pale, alert and oriented  Eyes: Reactive  ENMT: Slightly dry  Cardiovascular: Regular rate and rhythm  Respiratory: Clear to auscultation  Gastrointestinal: Soft, thin  Musculoskeletal: Profound muscle wasting  Skin: Her large right breast wound is covered and this does appear to extend into the sternal area as well as the axillary region, profound drainage  Neurologic: Nonfocal  Psychiatric: Calm  Other:       Pertinent Lab and or Imaging Tests:  Lab Results   Component Value Date/Time    Sodium 135 (L) 10/19/2021 06:35 AM    Potassium 4.1 10/19/2021 06:35 AM    Chloride 112 (H) 10/19/2021 06:35 AM    CO2 10 (LL) 10/19/2021 06:35 AM    Anion gap 13 10/19/2021 06:35 AM    Glucose 53 (LL) 10/19/2021 06:35 AM    BUN 46 (H) 10/19/2021 06:35 AM    Creatinine 0.75 10/19/2021 06:35 AM    BUN/Creatinine ratio 61 (H) 10/19/2021 06:35 AM    GFR est AA >60 10/19/2021 06:35 AM    GFR est non-AA >60 10/19/2021 06:35 AM    Calcium 7.8 (L) 10/19/2021 06:35 AM     Lab Results   Component Value Date/Time    Protein, total 3.6 (L) 10/19/2021 06:35 AM    Albumin 1.4 (L) 10/19/2021 06:35 AM           Total time:   Counseling / coordination time:   > 50% counseling / coordination?:

## 2021-10-22 NOTE — PROGRESS NOTES
Problem: Falls - Risk of  Goal: *Absence of Falls  Description: Document Gabe Sol Fall Risk and appropriate interventions in the flowsheet. Outcome: Not Progressing Towards Goal  Note: Fall Risk Interventions:  Mobility Interventions: Bed/chair exit alarm         Medication Interventions: Bed/chair exit alarm    Elimination Interventions: Bed/chair exit alarm              Problem: Patient Education: Go to Patient Education Activity  Goal: Patient/Family Education  Outcome: Not Progressing Towards Goal     Problem: Pressure Injury - Risk of  Goal: *Prevention of pressure injury  Description: Document Tay Scale and appropriate interventions in the flowsheet. Outcome: Not Progressing Towards Goal  Note: Pressure Injury Interventions:       Moisture Interventions: Absorbent underpads    Activity Interventions: Increase time out of bed    Mobility Interventions: HOB 30 degrees or less    Nutrition Interventions: Document food/fluid/supplement intake                     Problem: Patient Education: Go to Patient Education Activity  Goal: Patient/Family Education  Outcome: Not Progressing Towards Goal     Problem: Pain  Goal: Assess satisfaction of level of comfort and symptom control  Outcome: Not Progressing Towards Goal  Goal: *Control of acute pain  Outcome: Not Progressing Towards Goal     Problem: General Wound Care  Goal: *Non-infected wound: Improvement of existing wound, absence of infection, and maintenance of skin integrity  Outcome: Not Progressing Towards Goal  Goal: *Infected Wound: Prevention of further infection  Description: Infection control procedures (eg: clean dressings, clean gloves, hand washing, precautions to isolate wound from contamination, sterile instruments used for wound debridement) should be implemented.   Outcome: Not Progressing Towards Goal  Goal: Interventions  Outcome: Not Progressing Towards Goal     Problem: Community Resource Needs  Goal: Patient is receiving increased resource support to enhance ability to remain at home  Outcome: Not Progressing Towards Goal     Problem: Financial Deficits  Goal: Patient/family will voice understanding of available financial resources  Outcome: Not Progressing Towards Goal     Problem: Spiritual Evaluation  Goal: Identify beliefs/practices that support hospice experience  Description: Patient/family identify their beliefs/practices that impair Hospice experience. Patient/family identify their beliefs/practices that support Hospice experience. Patient coping identified. Spiritual distress identified and decreased with visit.   Outcome: Not Progressing Towards Goal

## 2021-10-22 NOTE — PROGRESS NOTES
Hospice medical director    Patient resting when I checked on her today. She only needed 3 as needed doses of Roxanol overnight but definitely more fatigued today. I have a sense that there is a possibility patient had a bit of a rally leaving the hospital after fluids and antibiotics but may have the other direction given her profound wound and leukocytosis noted in the hospital.    Talked with patient's brother via phone. He typically lives in [place] but has been visiting. Updated him on the current situation and he also felt that she appeared more fatigued today. Difficult to know prognosis overall but I think over the next 24 to 48 hours will know better how quickly she may be declining related to this tumor burden. He reiterates the caregiving situation at home is not ideal and he has very serious concerns about the ability for her to return home. I told him we would continue to evaluate day by day but also was honest that [facility] is not a long-term care facility so we always have to have discussions about discharge planning.

## 2021-10-22 NOTE — PROGRESS NOTES
Problem: Falls - Risk of  Goal: *Absence of Falls  Description: Document Ciarra Kinney Fall Risk and appropriate interventions in the flowsheet. Outcome: Progressing Towards Goal  Note: Fall Risk Interventions:  Mobility Interventions: Bed/chair exit alarm         Medication Interventions: Bed/chair exit alarm    Elimination Interventions: Bed/chair exit alarm              Problem: Pressure Injury - Risk of  Goal: *Prevention of pressure injury  Description: Document Tay Scale and appropriate interventions in the flowsheet. Outcome: Progressing Towards Goal  Note: Pressure Injury Interventions:       Moisture Interventions: Absorbent underpads    Activity Interventions: Increase time out of bed    Mobility Interventions: HOB 30 degrees or less    Nutrition Interventions: Document food/fluid/supplement intake                     Problem: Pain  Goal: Assess satisfaction of level of comfort and symptom control  Outcome: Progressing Towards Goal  Goal: *Control of acute pain  Outcome: Progressing Towards Goal     Problem: General Wound Care  Goal: *Non-infected wound: Improvement of existing wound, absence of infection, and maintenance of skin integrity  Outcome: Progressing Towards Goal  Goal: *Infected Wound: Prevention of further infection  Description: Infection control procedures (eg: clean dressings, clean gloves, hand washing, precautions to isolate wound from contamination, sterile instruments used for wound debridement) should be implemented.   Outcome: Progressing Towards Goal

## 2021-10-22 NOTE — HOSPICE
0700-Received report from [facility] and assumed care of pt. Pt is Routine LOC with primary diagnosis of Metastatic Breast Cancer. 0730-Pt awake and alert, able to converse and make needs known. Pt assisted to [facility], pt voided and returned back to bed. RN assessment completed. Pt spoke at length of her disease process and her medical interventions over past couple of years. Wound care done to right breast area. Pt tolerated care well. Pt refused any pain medication at this time, stating she did not feel any pain. 0830-Breakfast tray set up for pt. Pt able to feed self. 1000-Pt brother at bedside visiting, very pleasant. Pt drowsy, closes eyes intermittently. No pain at this time. 1200-Pt repositioned in bed for lunch. Pt refused lunch tray as she was full from her brother bringing her sausage and gravy biscuits, which she ate 75% of.  1400-Pt wound care done and Flagyl placed on wound. Area irrigated with saline and ABD pads and diaper used. Pt tolerated care well and denied any pain at this time. 1500-Spoke with pt brother Adrian Morganmax by phone. He requests to speak with PEDRO and MD.  This RN spoke with Lieutenant Mildred VASQUEZ and she is to call him this afternoon. 1600-Wound right breast dressing changed as pads become saturated with clear and puss fluid. Open areas on chest drain puss like fluid when pressing on surrounding area. Pt tolerated dressing change well.  1700-MD called pt brother Adrian Mcconnell by phone. Dinner tray set up for pt.  1800-wound care done to right breast.  Pt awake, but drowsy. Friend came to visit at bedside. 1900-report given to oncoming RN.     NAME OF PATIENT:  Camille Keenan    LEVEL OF CARE:  Routine    REASON FOR GIP:   NA    *PATIENT REMAINS ELIGIBLE FOR GIP LEVEL OF CARE AS EVIDENCED BY: NA      REASON FOR RESPITE:  NA    O2 SAFETY:  NA    FALL INTERVENTIONS PROVIDED:   Implemented/recommended use of non-skid footwear, Implemented/recommended resources for alarm system (personal alarm, bed alarm, call bell, etc.) , Implemented/recommended environmental changes (remove hazards, lower bed, improve lighting, etc.) and Implemented/recommended increased supervision/assistance    INTERDISPLINARY COMMUNICATION/COLLABORATION:  Physician, MSW and RN, CNA    NEW MEDICATION INITIATION DOCUMENTATION:  no new medications; current POC effective    Reason medication is being initiated:  NA    MD / Provider name consulted re: change in status / initiation of new medication:  NA    New Symptom(s):  NA    New Order(s):  NA    Name of the person notified of the changes:  NA    Name of person being taught:  NA    Instructions given:  NA    Side Effects taught:  NA    Response to teaching:  NA      COMFORTABLE DYING MEASURE:  Is Patient/family satisfied with symptom level?  yes    DISCHARGE PLAN:  Pt will continue routine care as MSW is working on finding pt proper caregivers for her discharge home. Pt will be followed by home hospice upon discharge.

## 2021-10-23 NOTE — HOSPICE
1900 Received report from [facility].  2941 Patient is resting in bed watching TV. Please see flow sheet for assessment. 1940 Patient called out for pain medication gave prn dose of Roxanol 5 mg.  2200 Changed patient's dressings on breast and chest. Assisted to bedside commode and repositioned in bed. 0000 Patient is sleeping with a relaxed face. 0200 Changed patient's dressing on her chest.  0530 Patient rang out stating that her abdomen was hurting bad, gave her prn 5 mg of Roxanol. Patient stated that it had been hurting for a while but she did not want to take medication because she feels that it made her sleep all day yesterday. 4091 Patient rang out and stated that her bandages are wet again, redressed her chest wound. She said that her stomach is better but is still hurting. Gave a second prn dose of Roxanol 5 mg.  0700 Report given. NAME OF PATIENT:  Olga Vela    LEVEL OF CARE: routine    REASON FOR GIP:   NA    *PATIENT REMAINS ELIGIBLE FOR [facility] LEVEL OF CARE AS EVIDENCED BY: (MUST BE ADDRESSED OF PATIENT GIP) NA      REASON FOR RESPITE:  Caregiver cannot care for patient due to:  Patient does not have a caregiver at home.     O2 SAFETY:  NA    FALL INTERVENTIONS PROVIDED:   Implemented/recommended use of fall risk identification flag to all team members, Implemented/recommended assistive devices and encouraged their use, Implemented/recommended resources for alarm system (personal alarm, bed alarm, call bell, etc.) , Implemented/recommended environmental changes (remove hazards, lower bed, improve lighting, etc.) and Implemented/recommended increased supervision/assistance    INTERDISPLINARY COMMUNICATION/COLLABORATION:  Physician and RN, CNA    NEW MEDICATION INITIATION DOCUMENTATION:  NA    Reason medication is being initiated:  NA    MD / Provider name consulted re: change in status / initiation of new medication:  NA    New Symptom(s):  NA    New Order(s): NA    Name of the person notified of the changes:  NA    Name of person being taught:  NA    Instructions given:  NA    Side Effects taught:  NA    Response to teaching:  NA      COMFORTABLE DYING MEASURE:  Is Patient/family satisfied with symptom level?  yes    DISCHARGE PLAN:  Home after routine stay.

## 2021-10-23 NOTE — PROGRESS NOTES
0700-Report received from [facility]  0745-assessment completed; pt laying in bed awake c/o 5/10 RUQ abdominal pain and cramping secondary to the start of senna last night  0755-5mg roxanol administered; right chest dressing reenforced; pt assisted to the bedside commode to void then assisted back to bed and made comfortable for breakfast; pt alert and oriented x4, pleasant and cooperative with clear but soft speech  0830-pt ate 25% of breakfast, reports pain is at a 4/10---pt refused pain medication at this time; pt reports she drank her prune juice from breakfast and hopes that this will promote a BM today; pt drowsy in bed, friend at the bedside  1015-pt visiting with her son and talking on the phone; pt denies needs at this time; pt refused morning senna  1115-pt bathed, linens changed; wound care completed--pt tolerated procedure well   1230-pt refused lunch; resting quietly in bed  1345-pt c/p 5/10 abdominal and right breast pain--5mg roxanol administered  1430-pt asleep in bed  1515-pt awoke for short period of time; pt reports pain is down to 4/10 which is \"acceptable\"  1608-pt asleep in bed  1640-pt assisted to the bedside commode but was so drowsy she was unable to sit upright; pt had her torso leaning on the bed in front of her; pt sat on bedside commode but was unable to void or have a BM; pt assisted back to bed and positioned on her side; dulcolax suppository administered  1715-pt asleep in bed, did not want to eat any dinner  1815-wound care completed; pt very somnolent and slow to respond; pt tolerated dressing change well then fell back asleep  1900-Report given to Laure 296:  Ruth Swain    LEVEL OF CARE:  routine    REASON FOR GIP: n/a      *PATIENT REMAINS ELIGIBLE FOR GIP LEVEL OF CARE AS EVIDENCED BY: (MUST BE ADDRESSED OF PATIENT GIP)      REASON FOR RESPITE:  n/a    O2 SAFETY:  n/a    FALL INTERVENTIONS PROVIDED:   Implemented/recommended use of non-skid footwear, Implemented/recommended assistive devices and encouraged their use, Implemented/recommended resources for alarm system (personal alarm, bed alarm, call bell, etc.) , Implemented/recommended environmental changes (remove hazards, lower bed, improve lighting, etc.) and Implemented/recommended increased supervision/assistance    INTERDISPLINARY COMMUNICATION/COLLABORATION:  Physician, MSW, Leena and RN, CNA    NEW MEDICATION INITIATION DOCUMENTATION:  n/a    Reason medication is being initiated:  n/a    MD / Provider name consulted re: change in status / initiation of new medication:  n/a    New Symptom(s):  n/a    New Order(s):  n/a    Name of the person notified of the changes:  n/a    Name of person being taught:  n/a    Instructions given:  n/a    Side Effects taught:  n/a    Response to teaching:  n/a      COMFORTABLE DYING MEASURE:  Is Patient/family satisfied with symptom level?  yes    DISCHARGE PLAN:  If patient does not pass at [facility], placement will be found

## 2021-10-23 NOTE — PROGRESS NOTES
Problem: Falls - Risk of  Goal: *Absence of Falls  Description: Document Starleen Freeze Fall Risk and appropriate interventions in the flowsheet. Outcome: Progressing Towards Goal  Note: Fall Risk Interventions:  Mobility Interventions: Patient to call before getting OOB, Bed/chair exit alarm         Medication Interventions: Patient to call before getting OOB, Bed/chair exit alarm    Elimination Interventions: Bed/chair exit alarm, Call light in reach, Patient to call for help with toileting needs              Problem: Pressure Injury - Risk of  Goal: *Prevention of pressure injury  Description: Document Tay Scale and appropriate interventions in the flowsheet. Outcome: Progressing Towards Goal  Note: Pressure Injury Interventions:  Sensory Interventions: Assess changes in LOC, Minimize linen layers, Float heels, Pressure redistribution bed/mattress (bed type), Keep linens dry and wrinkle-free    Moisture Interventions: Absorbent underpads, Apply protective barrier, creams and emollients    Activity Interventions: Pressure redistribution bed/mattress(bed type)    Mobility Interventions: HOB 30 degrees or less, Pressure redistribution bed/mattress (bed type), Float heels    Nutrition Interventions: Document food/fluid/supplement intake, Offer support with meals,snacks and hydration    Friction and Shear Interventions: Apply protective barrier, creams and emollients, Minimize layers                Problem: Pain  Goal: Assess satisfaction of level of comfort and symptom control  Outcome: Progressing Towards Goal  Note: Patient required encouragement to take pain medications but when accepted, she reports that pain decreased to a manageable level. Continue to assess, monitor and treat per POC and continue to encourage patient to take pain medication when needed.

## 2021-10-23 NOTE — PROGRESS NOTES
Problem: Falls - Risk of  Goal: *Absence of Falls  Description: Document Melida Roblero Fall Risk and appropriate interventions in the flowsheet. Outcome: Progressing Towards Goal  Note: Fall Risk Interventions:  Mobility Interventions: Bed/chair exit alarm         Medication Interventions: Bed/chair exit alarm    Elimination Interventions: Bed/chair exit alarm              Problem: Patient Education: Go to Patient Education Activity  Goal: Patient/Family Education  Outcome: Progressing Towards Goal     Problem: Pressure Injury - Risk of  Goal: *Prevention of pressure injury  Description: Document Tay Scale and appropriate interventions in the flowsheet. Outcome: Progressing Towards Goal  Note: Pressure Injury Interventions:  Sensory Interventions: Assess changes in LOC    Moisture Interventions: Absorbent underpads    Activity Interventions: Pressure redistribution bed/mattress(bed type)    Mobility Interventions: HOB 30 degrees or less    Nutrition Interventions: Document food/fluid/supplement intake    Friction and Shear Interventions: HOB 30 degrees or less                Problem: Patient Education: Go to Patient Education Activity  Goal: Patient/Family Education  Outcome: Progressing Towards Goal     Problem: Pain  Goal: Assess satisfaction of level of comfort and symptom control  Outcome: Progressing Towards Goal  Goal: *Control of acute pain  Outcome: Progressing Towards Goal     Problem: General Wound Care  Goal: *Non-infected wound: Improvement of existing wound, absence of infection, and maintenance of skin integrity  Outcome: Progressing Towards Goal  Goal: *Infected Wound: Prevention of further infection  Description: Infection control procedures (eg: clean dressings, clean gloves, hand washing, precautions to isolate wound from contamination, sterile instruments used for wound debridement) should be implemented.   Outcome: Progressing Towards Goal  Goal: Interventions  Outcome: Progressing Towards Goal Problem: Community Resource Needs  Goal: Patient is receiving increased resource support to enhance ability to remain at home  Outcome: Progressing Towards Goal     Problem: Financial Deficits  Goal: Patient/family will voice understanding of available financial resources  Outcome: Progressing Towards Goal     Problem: Spiritual Evaluation  Goal: Identify beliefs/practices that support hospice experience  Description: Patient/family identify their beliefs/practices that impair Hospice experience. Patient/family identify their beliefs/practices that support Hospice experience. Patient coping identified. Spiritual distress identified and decreased with visit.   Outcome: Progressing Towards Goal

## 2021-10-23 NOTE — HOSPICE
MSW returned phone call to patient's brother, Adrian Mcconnell. Brother shared that he came up to see patient and shared that he would be here for a few more days and would possibly stay longer if patient is very near end of life; but, noted that he cannot stay indefinitely. Brother requesting information on cremation services, but noted that funds are tight. MSW emailed him a list of cremation services and encouraged him to let team know if he has any further question or concerns about getting plans in place. Brother also shared that finances are tight for him. Brother also expressed concern that patient is at routine level of care as he shared that patient does not have a home to return to and that he nor her friends would be able to provide her care. Brother shared that her friends had supported patient for a while, but had been trying to get family to take over, and had never told patient this. Brother feels that patient has unrealistic expectations that her friends would continue to provide care for her if needed. MSW reassured brother that MSW will continue to work with patient, MPOA, and family on a safe discharge plan, but did stress that [facility] is not a long term care facility and that there is a time limit on how long patient would be able to remain at [facility] at a routine level of care. Brother appreciative of phone call and information and support provided.      PEDRO Miguel  [facility]  Time in: 4:45 pm  Time out: 5:05 pm

## 2021-10-24 NOTE — PROGRESS NOTES
0700-Report received from [facility]  0730-pt very restless in bed constantly moving her legs up and down and whimpering and furrowed brow; when asked if pt was in pain she replied \"yes\" and indicated that the pain is in her lower abdomen and cedeno area; assessment completed; pt attempts to speak but her voice is so weak it is very difficult to understand; right breast dressing intact without strike-through drainage; hands and feet cold to touch with likely mottling noted; attempted to make pt comfortable in bed with pillows  0745-pt continues to be restless in bed with constant whimpering and furrowed brow--PRN lorazepam and roxanol administered  0815-pt restless rolling around in bed, pulling on her catheter; pt with constant whimpering and furrowed brow; attempted to reposition pt with pillows but she rolled right off of them  0825-pts son at the bedside, update provided on pts decline and apparent imminence  0835-Call placed to Kailey Sandoval NP to report continued and contstant pain and restlessness, U/O of 100cc x24 hours, and mottling--pt changed from routine to GIP LOC for IV management of terminal agitation and pain; CNA unable to obtain pulse, BP, Sa02 or axillary temp; both radial and carotid pulses extremely weak  0900-subQ IV placed in left upper arm; scheduled morphine and lorazepam administered  0925-pt laying in bed with eyes closed; pt still restless and trying to pull at her catheter and pick at her blankets; furrowed brow and forehead observed along with moans/whimpers  0935-PRN morphine and lorazepam administered; son present at the bedside  1010-pt laying quietly in bed, no more restlessness or picking at the blankets; no whimpering heard; respirations 18/min; mild furrowed brow still observed-will monitor pt for need for additional PRN dose  1025-pt resting quietly; RR 16/min; facial expression neutral, no moaning or whimpering; pt without restlessness; son present at the bedside  1100-pt continues to lay quietly in bed with eyes closed; RR 12/min with unlabored respirations; no facial grimacing; no restlessness; no whimpering or moaning; family and friends visiting at the bedside  1200-family and friends present at the bedside; pt continues to rest quietly without restlessness or agitation; her respirations are 16/min with deep inhalations but no use of accessory muscles or signs of dyspnea; scheduled morphine and lorazepam administered  1246-chaplain Castaneda visiting patient at the beside  1310-wound care completed on right breast wound  1330-PRN morphine and lorazepam administered for moaning during wound care; pt positioned onto her left side  1420-pt resting quietly on her left side; eyes closed, mouth agape; no grimacing or furrowed brow; no moaning or restlessness; respirations deep but unlabored without use of accessory muscles;  Ethlyn Frizzle rounding at the bedside--morphine changed to dilaudid in anticipation of needing higher dose but low concentration (pt has subQ IV); family and friends at the bedside  1530-pt laying in bed with her mouth agape and light snoring; eyes closed; pt calm and quiet; no furrowed brow or forehead; brother at the bedside  1630-new subQ IV started right lateral thigh; scheduled dilaudid and lorazepam administered; RR 16/min with raspy but unlabored breaths; pt laying quietly with eyes closed; facial expression neutral   1715-family visiting at the bedside; pt remains in bed laying quietly with her eyes closed and mouth open; breathing unlabored with raspy and deep inhalations but no observation of use of accessory muscles; no grimacing or furrowed brow observed  1813-pt turned into the supine position with her sacrum/coccyx floated with pillows; pt resting peacefully with eyes closed; respirations becoming more shallow but remain unlabored without use of accessory muscles; hands and arms still cold to touch with mottling; facial expression neutral and without grimacing or furrowed brow; family present at bedside  1900-Report given to [address]:  Rodolfo Jones    LEVEL OF CARE:  GIP    REASON FOR GIP:   Pain, despite numerous changes in medications, Terminal agitation, despite changes to medications, Medication adjustment that must be monitored 24/7 and Stabilizing treatment that cannot take place at home    *PATIENT REMAINS ELIGIBLE FOR GIP LEVEL OF CARE AS EVIDENCED BY: (MUST BE ADDRESSED OF PATIENT GIP)      REASON FOR RESPITE:  n/a    O2 SAFETY:  n/a    FALL INTERVENTIONS PROVIDED:   Implemented/recommended use of non-skid footwear, Implemented/recommended use of fall risk identification flag to all team members, Implemented/recommended resources for alarm system (personal alarm, bed alarm, call bell, etc.) , Implemented/recommended environmental changes (remove hazards, lower bed, improve lighting, etc.) and Implemented/recommended increased supervision/assistance    INTERDISPLINARY COMMUNICATION/COLLABORATION:  Physician, MSW, Leena and RN, CNA    NEW MEDICATION INITIATION DOCUMENTATION:  Consulted AT MD to report change in pt status, Obtained Order from Provider for initiation of symptom relief medication /other medication needed and Documentation completed in Clinical Note in [facility]    Reason medication is being initiated:  Uncontrolled pain and agitation    MD / Provider name consulted re: change in status / initiation of new medication:  :Nneka Sandoval NP    New Symptom(s):  Pain and agitation    New Order(s):  See [facility]    Name of the person notified of the changes:  pts brother at the bedside, friend Raina Chan and sister all present    Name of person being taught:  pts brother Eulalia Burnett, friend Raina Chan, pts sister all att the bedsid    Instructions given:  RN will administer medications    Side Effects taught:  yes    Response to teaching:  Verbalized understanding      COMFORTABLE DYING MEASURE:  Is Patient/family satisfied with symptom level?  yes    DISCHARGE PLAN:  Patient is now imminent and will pass at [facility]; should pt become stable then placement will be sought for disposition after routine days are exhausted

## 2021-10-24 NOTE — HOSPICE
1900 Report received from [place], [address] [place] [address] Patient resting quietly in bed with eyes closed. 2015 Patient resting quietly in bed. RN assessment completed. Patient is very drowsy, slow to respond, speech is soft. Skin cool to the touch. Temperature adjusted in the room. Rates pain 4/10 to her right breast/chest. Administered prn roxanol 5mg.  2120 Patient resting quietly in bed with eyes closed, respirations unlabored, relaxed facial expression. 2300 Patient resting quietly in bed with eyes closed. Respirations shallow, unlabored. Neutral facial expression. 0100 Patient resting in bed, has pulled her covers back with one leg crossed over the over. Respirations quiet, unlabored. 0300 Patient continues to rest with eyes closed. No facial grimacing. 0330 Patient in bed awake, feels the urge to use the bathroom. Patient placed on a bedpan but unable to go. Patient bathed by CNA. Administered prn roxanol for abdominal pain 5/10. Patient does not wish to have wound care done at this time. Dressing currently dry and intact, no breakthrough drainage noted. 9588 Patient resting quietly in bed with eyes closed. Facial expression relaxed, no grimacing or moaning. 7740 Patient in bed awake, restless. States she needs to use the bathroom. Patient placed on the bedpan, unable to go. Patient agreeable to a catheter. 14 FR cedeno catheter placed. Patient states she still feels like she needs to void. Patient still restless. Contacted Ritika Walters NP to switch lorazepam from IV to SL. Patient medicated with prn lorazepam and prn roxanol. Patient placed on the bedpan again to have a BM, still unable to go. Unable to complete wound care at this time because patient is restless and unable to sit still.  0600 Patient lying still in the bed, no longer throwing her legs in the air or pulling off her covers.  Facial expression neutral, no moaning or facial grimacing.  0645 Patient laying on her left side, hands clasped together under head. Extremities appear relaxed. 0700 Report given to [place], RN    NAME OF PATIENT:  Marge Mayes    LEVEL OF CARE:  Routine    REASON FOR GIP:   n/a    *PATIENT REMAINS ELIGIBLE FOR GIP LEVEL OF CARE AS EVIDENCED BY: (MUST BE ADDRESSED OF PATIENT GIP)      REASON FOR RESPITE:  n/a    O2 SAFETY:  n/a    FALL INTERVENTIONS PROVIDED:   Implemented/recommended use of non-skid footwear, Implemented/recommended use of fall risk identification flag to all team members, Implemented/recommended resources for alarm system (personal alarm, bed alarm, call bell, etc.) , Implemented/recommended environmental changes (remove hazards, lower bed, improve lighting, etc.) and Implemented/recommended increased supervision/assistance    INTERDISPLINARY COMMUNICATION/COLLABORATION:  Physician, MSW, Leena and RN, CNA    NEW MEDICATION INITIATION DOCUMENTATION:  n/a    Reason medication is being initiated:  n/a    MD / Provider name consulted re: change in status / initiation of new medication:  n/a    New Symptom(s):  n/a    New Order(s):  n/a    Name of the person notified of the changes:  n/a    Name of person being taught:  n/a    Instructions given:  n/a    Side Effects taught:  n/a    Response to teaching:  n/a      COMFORTABLE DYING MEASURE:  Is Patient/family satisfied with symptom level?  yes    DISCHARGE PLAN:  Patient will remain routine care as MSW works to find caregivers if patient returns home.

## 2021-10-24 NOTE — H&P
[facility] to Those in Need  [phone]    Patient Name: Marge Mayes  YOB: 1960    Date of Provider Hospice Visit: 10/24/21    Level of Care:   [x] General Inpatient (GIP)    [x] Routine   [] Respite    Current Location of Care:  [] [facility] [] [facility] [] [facility] [] [facility] [x] Hospice House [facility], patient referred from:  [] [facility] [] [facility] [] [facility] [] [facility] [] Home [] Other:     Date of Original Hospice Admission: 10/19/2021--transitioned to [facility] 10/24/21  Hospice Medical Director at time of admission: [address] [place] [address] Diagnosis: Metastatic breast cancer  Diagnoses RELATED to the terminal prognosis:  Intractable pain, large wound right breast/chest wall  Other Diagnoses: Patient has not had medical care in over [facility] years     HOSPICE SUMMARY     Marge Mayes is a 57y.o. year old who was admitted to [address] [place] [address]. Patient is a 62-year-old female admitted to [facility] level care secondary to metastatic breast cancer. Patient admitted to [facility] in mid October for a breast mass which she had been treating with a herbalist and has not been doing any [place] medicine for over twenty years. She has a large right breast mass that is fungating as well as advanced into the lymph nodes. She had been doing \"gentle deworming \"/herbal medications. \"  CT scan showed large ulcerated right breast/chest wall mass with associated intercostal involvement, pleural involvement, rib involvement as well as concern about hepatic lesions. Patient chose hospice care. Today she had increased pain and moaning with mottling of the LE's and coolness of the extremities. She was changed to GIP LOC and an IV was placed and IV medications were started. The patient's principle diagnosis has resulted in AMS, pain, fungating lesion right chest  Refer to LCD     Functionally, the patient's Karnofsky and/or Palliative Performance Scale has declined over a period of years and is estimated at 10%.    The patient is dependent on the following ADLs: All ADL's    Objective information that support this patients limited prognosis includes:      EXAM:  CT CHEST, ABDOMEN, PELVIS WITH CONTRAST     COMPARISON: Chest radiograph earlier today     TECHNIQUE:  CT imaging of the chest, abdomen and pelvis was performed after the  uneventful intravenous administration of contrast material.  Coronal and  sagittal reconstructions were generated. CT dose reduction was achieved through  use of a standardized protocol tailored for this examination and automatic  exposure control for dose modulation.     FINDINGS:     THYROID: Unremarkable. MEDIASTINUM/SAM: Subcentimeter precarinal and right hilar lymph nodes. HEART/PERICARDIUM: Unremarkable. LUNGS/PLEURA: Vague and slightly nodular groundglass opacities in the posterior  right upper lobe and superior right lower lobe, with nodular thickening of the  right major fissure. Minimal groundglass opacity lateral and posterior left  upper lobe. No pleural effusion. No pneumothorax pleural-based 8mm nodular  density lateral right upper lobe. BONES: Large, ulcerated and hypervascular mass occupying majority of the right  breast and axilla and extending inferiorly in the lateral chest wall, measuring  in total 18 x 5 x 15 cm. Associated right axillary masses/lymph nodes measuring  between 13 and 15 mm. Mass extends into the intercostal space between the second  and third, third and fourth, fourth and fifth, fifth and sixth and possibly  sixth and seventh ribs. Difficult to exclude pleural involvement particularly in  the anterior right upper lung between the second and fourth ribs. Irregular  lucency and sclerosis involving the anterior right first rib as well as anterior  third rib.  Small sclerotic focus in the superior aspect of the manubrium  extensive vascular collateralization in the right chest wall and axilla.      LIVER: 7 mm round hypodensity segment 6 of the liver is too small to  characterize. GALLBLADDER: Unremarkable. SPLEEN: No enlargement or lesion. PANCREAS: No mass or ductal dilatation. ADRENALS: No mass. KIDNEYS: No mass, calculus, or hydronephrosis. GI TRACT: No evidence of bowel obstruction. Sigmoid diverticulosis without  diverticulitis. Nonobstructing intussusception involving jejunal loops in the  left midabdomen  PERITONEUM: No free air or free fluid. APPENDIX: Unremarkable. RETROPERITONEUM: No aortic aneurysm. LYMPH NODES: None enlarged. ADDITIONAL COMMENTS: N/A.     URINARY BLADDER: No mass or calculus. LYMPH NODES: None enlarged. REPRODUCTIVE ORGANS: Uterus is present. FREE FLUID: Small amount. BONES: No destructive bone lesion. ADDITIONAL COMMENTS: N/A.     IMPRESSION     1. Large, ulcerated right breast/chest wall mass with associated intercostal  involvement and probable pleural involvement as discussed in detail above. Associated right axillary adenopathy as well as vascular collateralization. 2. Abnormal appearance of the right first and third ribs, and small sclerotic  lesion in the upper manubrium. 3. Ill-defined groundglass nodularity right upper and lower lobes as well as  nodular pleural and major fissure thickening which could be  infectious/inflammatory or neoplastic. Minimal groundglass opacities left lung. 4. Subcentimeter right hepatic hypodensity too small to characterize,  Indeterminate. The patient/family chose comfort measures with the support of Hospice. HOSPICE DIAGNOSES   Active Symptoms:  1. Cancer associated pain  2. Large right breast wound that also extends into the sternal area  3. Hospice care       PLAN   1. Patient will now be admitted to [facility]. She developed increased pain today not responding to po medications and mottling and coolness of her LE's.    2.  Dilaudid 1 mg every 4 hours and every 15 minutes prn for pain, sob.   3. Lorazepam 1 mg every 4 hours and every 15 minutes prn for sob, restless, agitation. 4. Robinul 0.2 mg every 4 hours prn secretions. 5. Comfort meds for all other symptoms  6. Continue wound care with [employer] twice daily to the wound. Wound care will be palliative and if daily changes are adequate to control exudate and odor, that is fine. Plan reviewed with bedside nursing team  7.  and SW to support family needs  8. Disposition: Patient expected to die at [facility]  9. Hospice Plan of care was reviewed in detail and agree with current plan of care    Prognosis estimated based on 10/24/21 clinical assessment is:   [] Hours to Days    [x] Days to Weeks I think weeks is unlikely  [] Other:    Communicated plan of care with: Hospice Case Manager; Hospice IDT; Care Team     GOALS OF CARE     Patient/Medical POA stated Goal of Care: Hospice care    [x] I have reviewed and/or updated ACP information in the Advance Care Planning Navigator. This information is available in the [facility] [address] link in the patient's chart header. Primary Decision [facility] ([address]):   Primary Decision Maker: Ricki Ledesma - Friend - [phone]    Resuscitation Status: DNR  If DNR is there a Durable DNR on file? : [x] Yes [] No (If no, complete Durable DNR)    HISTORY     History obtained from: Chart, patient, hospice liaison    CHIEF COMPLAINT: Right breast wound  The patient is:   [x] Verbal  [] Nonverbal  [] Unresponsive    HPI/SUBJECTIVE: Patient is alert and oriented. She is able to answer all questions. She admits to having some pain to the right chest wall although she did just received morphine. She has been eating very little and admits to significant weight loss. Wound has been extending significantly but currently covered    10/20-patient is awake and interactive. Denies any pain currently. She slept for 11 hours last night which was the first time she slept in many days. She is eating a little.   Still having some discomfort in the right breast area but overall has been controlled with the morphine. 10/21-patient is awake, denies minimal pain at this time    10/24--patient is not awake and does not respond to name call or touch. She has cool extremities and mottling of her feet. She was changed to GIP LOC for increased pain not responding to oral medications. REVIEW OF SYSTEMS     The following systems were: [] reviewed  [x] unable to be reviewed         Adult Non-Verbal Pain Assessment Score:5/10    Face  [] 0   No particular expression or smile  [x] 1   Occasional grimace, tearing, frowning, wrinkled forehead  [] 2   Frequent grimace, tearing, frowning, wrinkled forehead    Activity (movement)  [] 0   Lying quietly, normal position  [x] 1   Seeking attention through movement or slow, cautious movement  [] 2   Restless, excessive activity and/or withdrawal reflexes    Guarding  [] 0   Lying quietly, no positioning of hands over areas of body  [x] 1   Splinting areas of the body, tense  [] 2   Rigid, stiff    Physiology (vital signs)  [] 0   Stable vital signs  [x] 1   Change in any of the following: SBP > 20mm Hg; HR > 20/minute  [] 2   Change in any of the following: SBP > 30mm Hg; HR > 25/minute    Respiratory  [] 0   Baseline RR/SpO2, compliant with ventilator  [x] 1   RR > 10 above baseline, or 5% drop SpO2, mild asynchrony with ventilator  [] 2   RR > 20 above baseline, or 10% drop SpO2, asynchrony with ventilator     FUNCTIONAL ASSESSMENT     Palliative Performance Scale (PPS): 10%       PSYCHOSOCIAL/SPIRITUAL ASSESSMENT     Active Problems:    * No active hospital problems. *    No past medical history on file. Past Surgical History:   Procedure Laterality Date    HX CATARACT REMOVAL        Social History     Tobacco Use    Smoking status: Never Smoker    Smokeless tobacco: Never Used   Substance Use Topics    Alcohol use: Not on file     No family history on file.    No Known Allergies   Current Facility-Administered Medications   Medication Dose Route Frequency    morphine injection 4 mg  4 mg SubCUTAneous Q4H    morphine injection 4 mg  4 mg SubCUTAneous Q15MIN PRN    LORazepam (ATIVAN) injection 1 mg  1 mg SubCUTAneous Q4H    LORazepam (ATIVAN) injection 1 mg  1 mg SubCUTAneous Q15MIN PRN    senna-docusate (PERICOLACE) 8.6-50 mg per tablet 2 Tablet  2 Tablet Oral DAILY    metroNIDAZOLE (FLAGYL) tablet 1,000 mg  1,000 mg Topical DAILY    bisacodyL (DULCOLAX) suppository 10 mg  10 mg Rectal DAILY PRN    glycopyrrolate (ROBINUL) injection 0.2 mg  0.2 mg IntraVENous Q4H PRN    acetaminophen (TYLENOL) suppository 650 mg  650 mg Rectal Q4H PRN        PHYSICAL EXAM     Wt Readings from Last 3 Encounters:   10/19/21 48 kg (105 lb 13.1 oz)       Visit Vitals  BP (!) 88/50 (BP 1 Location: Left upper arm, BP Patient Position: At rest)   Pulse 85   Temp 97.1 °F (36.2 °C)   Resp 22   SpO2 98%       Supplemental O2  [] Yes  [x] NO  Last bowel movement:     Currently this patient has:  [x] Peripheral IV [] PICC  [] PORT [] ICD    [] Hayden Catheter [] NG Tube   [] PEG Tube    [] Rectal Tube [] Drain  [] Other:     Constitutional: Thin, pale, unresponsive lying in bed  Eyes: closed  ENMT: Slightly dry, + mouth breathing  Cardiovascular: tachycardia, no edema  Respiratory: Clear to auscultation with scattered rhonchi, no increased wob  Gastrointestinal: Soft, no distention  Musculoskeletal: Profound muscle wasting  Skin: Her large right breast wound is covered and this does appear to extend into the sternal area as well as the axillary region, profound drainage, odor controlled at present  Neurologic: not responsive  Psychiatric: not responsive  Other:       Pertinent Lab and or Imaging Tests:  Lab Results   Component Value Date/Time    Sodium 135 (L) 10/19/2021 06:35 AM    Potassium 4.1 10/19/2021 06:35 AM    Chloride 112 (H) 10/19/2021 06:35 AM    CO2 10 (LL) 10/19/2021 06:35 AM    Anion gap 13 10/19/2021 06:35 AM    Glucose 53 (LL) 10/19/2021 06:35 AM    BUN 46 (H) 10/19/2021 06:35 AM    Creatinine 0.75 10/19/2021 06:35 AM    BUN/Creatinine ratio 61 (H) 10/19/2021 06:35 AM    GFR est AA >60 10/19/2021 06:35 AM    GFR est non-AA >60 10/19/2021 06:35 AM    Calcium 7.8 (L) 10/19/2021 06:35 AM     Lab Results   Component Value Date/Time    Protein, total 3.6 (L) 10/19/2021 06:35 AM    Albumin 1.4 (L) 10/19/2021 06:35 AM           Konstantin Sandoval, NP

## 2021-10-24 NOTE — PROGRESS NOTES
Problem: Falls - Risk of  Goal: *Absence of Falls  Description: Document Varun Ramirez Fall Risk and appropriate interventions in the flowsheet. Outcome: Progressing Towards Goal  Note: Fall Risk Interventions:  Mobility Interventions: Bed/chair exit alarm, Patient to call before getting OOB         Medication Interventions: Bed/chair exit alarm, Patient to call before getting OOB    Elimination Interventions: Bed/chair exit alarm, Call light in reach, Toileting schedule/hourly rounds              Problem: Pressure Injury - Risk of  Goal: *Prevention of pressure injury  Description: Document Tay Scale and appropriate interventions in the flowsheet. Outcome: Progressing Towards Goal  Note: Pressure Injury Interventions:  Sensory Interventions: Assess changes in LOC, Assess need for specialty bed, Check visual cues for pain, Float heels, Keep linens dry and wrinkle-free, Minimize linen layers    Moisture Interventions: Absorbent underpads, Apply protective barrier, creams and emollients, Maintain skin hydration (lotion/cream)    Activity Interventions: Pressure redistribution bed/mattress(bed type)    Mobility Interventions: Float heels, HOB 30 degrees or less, Pressure redistribution bed/mattress (bed type)    Nutrition Interventions: Document food/fluid/supplement intake, Offer support with meals,snacks and hydration    Friction and Shear Interventions: HOB 30 degrees or less, Lift sheet, Minimize layers                Problem: Pain  Goal: Assess satisfaction of level of comfort and symptom control  Outcome: Progressing Towards Goal  Goal: *Control of acute pain  Outcome: Progressing Towards Goal     Problem: General Wound Care  Goal: *Infected Wound: Prevention of further infection  Description: Infection control procedures (eg: clean dressings, clean gloves, hand washing, precautions to isolate wound from contamination, sterile instruments used for wound debridement) should be implemented.   Outcome: Progressing Towards Goal  Goal: Interventions  Outcome: Progressing Towards Goal

## 2021-10-24 NOTE — HOSPICE
The hospice chaplain met with Ms. Tez Kang brother and sister this afternoon. One of Ms. Boucher's friends was at her bedside. Her brother, Karrie Rey, visiting from [facility] and his sister who lives locally requested prayer. The hospice chaplain offered a prayer seeking peace and strength at this time. The family was appreciative of the visit and offer of prayer. The family was appreciative of the care provided by the [facility] staff. They also mentioned that their sister had indicated that she felt at peace there.      Chaplains' services are available upon request.

## 2021-10-25 NOTE — HOSPICE
1900 Report received from [facility], [id number] [facility] Patient resting quietly in bed, eyes are closed. Patients respirations are shallow, unlabored. Lung sounds are clear. Absent bowel sounds. Skin is pale and cool to the touch, mottling to hands, arms and feet. Patient has week radial pulses, unable to palpate pedal pulse. Patient has a neutral facial expression, no grimacing noted. 2030 Patient medicated with scheduled dilaudid and lorazepam. Provided a blanket and pillow to patients brother who is staying the night. 2130 Patient resting quietly in bed with eyes closed, mouth slightly agape. Respirations shallow, unlabored, RR 12.   2230 Patient resting quietly with eyes closed. Respirations are deep but unlabored, no use of accessory muscles. Dressing to chest remains intact with no breakthrough drainage. 2330 Patient resting quietly, RR 16. Patient not grimacing, no restlessness noted. 0030 Patient medicated with scheduled lorazepam and dilaudid. Received a bed bath, linen changed and repositioned onto left side. More mottling noticed to patients feet. 0130 Patient resting quietly with he eyes closed. Patient has a neutral facial expression. 0230 Patient resting in bed on left side. Respirations are deep but unlabored no use of accessory muscles. 4185 Patient resting quietly in bed, respirations more shallow. TOD [id number] Called into room by patients brother. Patient absent of respirations and heart sounds.     NAME OF PATIENT:  Diandra Bull    LEVEL OF CARE:  GIP    REASON FOR GIP:   Pain, despite numerous changes in medications, Terminal agitation, despite changes to medications, Medication adjustment that must be monitored 24/7 and Stabilizing treatment that cannot take place at home    *PATIENT REMAINS ELIGIBLE FOR GIP LEVEL OF CARE AS EVIDENCED BY: (MUST BE ADDRESSED OF PATIENT GIP)      REASON FOR RESPITE:  n/a    O2 SAFETY:  n/a    FALL INTERVENTIONS PROVIDED:   Implemented/recommended use of non-skid footwear, Implemented/recommended use of fall risk identification flag to all team members, Implemented/recommended resources for alarm system (personal alarm, bed alarm, call bell, etc.) , Implemented/recommended environmental changes (remove hazards, lower bed, improve lighting, etc.) and Implemented/recommended increased supervision/assistance    INTERDISPLINARY COMMUNICATION/COLLABORATION:  Physician, MSW, Leena and RN, CNA    NEW MEDICATION INITIATION DOCUMENTATION:  n/a    Reason medication is being initiated:  n/a    MD / Provider name consulted re: change in status / initiation of new medication:  n/a    New Symptom(s):  n/a    New Order(s):  n/a    Name of the person notified of the changes:  n/a    Name of person being taught:  n/a    Instructions given:  n/a    Side Effects taught:  n/a    Response to teaching:  n/a      COMFORTABLE DYING MEASURE:  Is Patient/family satisfied with symptom level?  yes    DISCHARGE PLAN:  Patient likely to pass at [facility]. Should patient stabilize placement will be found.

## 2021-10-26 NOTE — HOSPICE
[address] Bereavement/Condolence Call: This MSW called pts friend, Theresa Evans to offer condolences and support. MSW offered [address] information for ongoing support. Brenna Su shared that everyone is doing ok at this time.        Otilia Walker, MSW, [address]    [phone]